# Patient Record
Sex: FEMALE | Race: WHITE | Employment: FULL TIME | URBAN - METROPOLITAN AREA
[De-identification: names, ages, dates, MRNs, and addresses within clinical notes are randomized per-mention and may not be internally consistent; named-entity substitution may affect disease eponyms.]

---

## 2018-01-08 ENCOUNTER — OFFICE VISIT (OUTPATIENT)
Dept: NEUROLOGY | Age: 68
End: 2018-01-08

## 2018-01-08 VITALS — SYSTOLIC BLOOD PRESSURE: 140 MMHG | DIASTOLIC BLOOD PRESSURE: 85 MMHG | OXYGEN SATURATION: 98 % | HEART RATE: 77 BPM

## 2018-01-08 DIAGNOSIS — G43.009 MIGRAINE WITHOUT AURA AND WITHOUT STATUS MIGRAINOSUS, NOT INTRACTABLE: Primary | ICD-10-CM

## 2018-01-08 DIAGNOSIS — M54.2 CERVICALGIA OF OCCIPITO-ATLANTO-AXIAL REGION: ICD-10-CM

## 2018-01-08 DIAGNOSIS — R20.0 RIGHT LEG NUMBNESS: ICD-10-CM

## 2018-01-08 DIAGNOSIS — M51.9 LUMBAR DISC DISEASE: ICD-10-CM

## 2018-01-08 DIAGNOSIS — Z78.0 ASYMPTOMATIC MENOPAUSAL STATE: ICD-10-CM

## 2018-01-08 DIAGNOSIS — E83.52 HYPERCALCEMIA: ICD-10-CM

## 2018-01-08 RX ORDER — CYCLOBENZAPRINE HCL 5 MG
5 TABLET ORAL
Qty: 60 TAB | Refills: 5 | Status: SHIPPED | OUTPATIENT
Start: 2018-01-08 | End: 2020-08-18

## 2018-01-08 RX ORDER — SUMATRIPTAN 100 MG/1
100 TABLET, FILM COATED ORAL
Qty: 9 TAB | Refills: 5 | Status: SHIPPED | OUTPATIENT
Start: 2018-01-08 | End: 2020-08-18

## 2018-01-08 NOTE — LETTER
Dear Jovany Porras MD, Thank you for allowing me to see your patient, Layton Kong for a neurological consultation. Please see my impression and recommendations as outlined in my note. Sincerely, Yovany Tello MD 
[facility] at [address] [place] [address] BY: 
Jovany Porras MD 
 
CHIEF COMPLAINT: 
migraines HISTORY OF PRESENT ILLNESS HISTORY PROVIDED BY: 
Patient Layton Kong is a 75 y.o. female who I am asked to see in consultation for migraines. She has had migraines since age 14. They were very debilitating at the time. She would have to lie in a dark room for most of them. She tried and failed multiple medications. She used to have to be hospitalized for several days and get IV Demerol to report them. Over the last 10 years she reports that her migraines have significantly improved. Recently however she has started having more issues with headaches again. Last Friday she started with a headache on her left shoulder that radiates up to the back of her head. It then affected her bilateral occiput and then radiated up to her left eye. She tried to do a warm washcloth alternating with ice, which typically works to break the headache cycle, but this was not helpful. She does have Imitrex to take and she takes 25 mg tablets. Normally she can take this and have no further issues, but it is not working for his current headache. On Saturday she did have some congestion and could not even bend her head down. She denies any nausea associated with anything. She does have anxiety for which she takes Celexa and one fourth tablet of a 0.5 Klonopin. She would like to try to wean off of the Klonopin. She does follow with Dr. Niyah Love with cardiology. She recently had labs over the last 6 months or fine.  
She does have a history of lumbar stenosis, low back pain, and degenerative disks in that area. She is due for updated imaging but has not had this done yet. She does get pain radiating down her right leg as well as numbness on her right leg. She denies actually having any neck issues prior to this. She denies having any trauma to the neck or sleeping funny on her neck that she is aware of. Today she mostly can pinpoint an area on her neck where the pain is located. Patient does have a history of falls. She did have a CT of the head one year ago after a significant fall where she smacked her head into a door frame. The head CT showed a scalp hematoma but no intracranial process. Patient has not tried any muscle relaxers for her current neck issue. She is also not tried massage. PMH Past Medical History:  
Diagnosis Date  Hx of bone density study 3/12/12 Normal  
 Hx of mammogram 7/31/14 Negative  Panic attacks  Pap smear for cervical cancer screening 3/22/13 Negative, HPV Negative  Vitamin D deficiency [facility] Social History Social History  Marital status:  Spouse name: N/A  
 Number of children: N/A  
 Years of education: N/A Social History Main Topics  Smoking status: Never Smoker  Smokeless tobacco: Never Used Comment: Never used vapor or e-cigs  Alcohol use No  
 Drug use: No  
 Sexual activity: Yes  
  Partners: Male Birth control/ protection: None Other Topics Concern  Not on file Social History Narrative [facility] Family History Problem Relation Age of Onset  Colon Cancer Paternal Grandmother  Hypertension Father ALLERGIES Allergies Allergen Reactions  Demerol [Meperidine] Other (comments) BP drops  Pcn [Penicillins] Rash CURRENT MEDS Current Outpatient Prescriptions Medication Sig Dispense Refill  atorvastatin (LIPITOR) 10 mg tablet   3  
 aspirin delayed-release 81 mg tablet Take 81 mg by mouth daily.  red yeast rice extract 600 mg cap Take 1,000 mg by mouth daily.  cholecalciferol, vitamin D3, 2,000 unit tab Take 1 Tab by mouth daily.  magnesium 250 mg tab Take 1 Tab by mouth daily. 5 Tab 0  clonazePAM (KLONOPIN) 0.5 mg tablet Take 0.5 mg by mouth nightly.  citalopram (CELEXA) 20 mg tablet Take 10 mg by mouth every evening.  SUMAtriptan (IMITREX) 100 mg tablet Take 100 mg by mouth as needed.  OTHER     
 
 
REVIEW OF SYSTEMS:  
 
Y  N       Y  N  Y  N   Y  N 
  AIDS            Falls    Memory Loss     Shortness of breath Anxiety            Fatigue   Muscle Pain           Skipped beats Chest Pain     Frequent HA   Ms Weakness        Snoring Constipation  Hearing loss   Nause/Vomiting     Stomach Pain Cough           Hepatitis   Neuropathy            Swallowing difficulty Depression   Incontinence   Poor appetite         Vertigo Diarrhea         Joint Pain   Rash                      Visual disturbances Fainting          Leg Swelling   Ringing ears          Weight changes Unable to obtain  ROS due to  mental status change  sedated   intubated PREVIOUS WORKUP IMAGING: CT head no acute intracranial process (I personally reviewed these images in PACS and this is my impression) LABS Results for orders placed or performed in visit on 02/02/16 PAP, IG, RFX HPV ASCUS ([id number]) Result Value Ref Range Diagnosis Comment Specimen adequacy Comment Clinician provided ICD10 Comment Performed by: Comment Harpal Green Note: Comment Test methodology Comment Harpal Green Comment PHYSICAL EXAM 
Visit Vitals  /85  Pulse 77  SpO2 98% General:  Alert, cooperative, no distress. Head:  Normocephalic, without obvious abnormality, atraumatic. Eyes:  Conjunctivae/corneas clear. Pupils equal, round, reactive to light. Extraocular movements intact, VFF, NO papilledema Lungs: 
Heart:   Non labored breathing Regular rate and rhythm, no carotid bruits Abdomen:   Soft, non-distended Extremities: Extremities normal, atraumatic, no cyanosis or edema. Pulses: 2+ and symmetric all extremities. Skin: Skin color, texture, turgor normal. No rashes or lesions. Neurologic:  Gen: Attention normal 
           Language: naming, repetition, fluency normal 
           Memory: intact recent and remote memory Cranial Nerves: 
I: smell Not tested II: visual fields Full to confrontation II: pupils Equal, round, reactive to light II: optic disc No papilledema III,VII: ptosis none III,IV,VI: extraocular muscles  Full ROM V: mastication normal  
V: facial light touch sensation  normal  
VII: facial muscle function   symmetric VIII: hearing symmetric IX: soft palate elevation  normal  
XI: trapezius strength  5/5 XI: sternocleidomastoid strength 5/5 XI: neck flexion strength  5/5 XII: tongue  midline Motor: normal bulk and tone, no tremor Strength: 5/5 all four extremities Sensory: intact to LT, PP, vibration, and temperature Coordination: FTN intact, Rhomberg negative Gait: normal gait including tandem Reflexes: 2+ in upper extremities and left lower extremity, 3+ in right lower extremity IMPRESSION Carmita Tellez is a 75 y.o. female who presents for evaluation of migraines. Currently patient has a 3 day migraine type headache with associated neck pain. I suspect this is a cervicogenic type of headache. Will try muscle relaxers. If this is not beneficial will try steroids. Will wait on any imaging of the neck at this time. In terms of preventative I do not think patient needs this at this time. Will continue Imitrex for abortive. She does continue to have low back pain and radiating numbness down the right leg. Will do an MRI of the lumbar spine to evaluate this further. RECOMMENDATIONS 1. Start Flexeril 5 mg nightly and can increase to 10 mg if needed 2.  Continue abortive with Imitrex 3. No need for preventative for migraine at this time as patient is only having sporadic migraines at this point 4. MRI of the lumbar spine 5. DEXA scan (patient previously had disorder but has not had it done) 6. Will call if Flexeril was not helpful and will do a Medrol Dosepak and consider neck imaging 7. Patient is also interested in weaning off of her clonazepam.  We discussed how to slowly do this over several months if she would like. Advised her to wait until this current headache is resolved. FU 6 months but will notify sooner of imaging results and further testing if indicated Magdaleno Montenegro MD 
 
CC: Aleyda Ureña MD 
Fax: [phone] This note was created using voice recognition software. Despite editing, there may be syntax errors. This note will not be viewable in [address]

## 2018-01-08 NOTE — MR AVS SNAPSHOT
Visit Information Date & Time Provider Department Dept. Phone Encounter #  
 1/8/2018  1:00 PM Chip Puentes MD [address] [place] [address] [facility] [phone] [id number] Upcoming Health Maintenance Date Due Hepatitis C Screening 1950 FOBT Q 1 YEAR AGE 50-75 10/7/1999 ZOSTER VACCINE AGE 60> 8/7/2009 GLAUCOMA SCREENING Q2Y 10/7/2014 Pneumococcal 65+ Low/Medium Risk (1 of 2 - PCV13) 10/7/2014 MEDICARE YEARLY EXAM 10/7/2014 Influenza Age 5 to Adult 8/1/2017 BREAST CANCER SCRN MAMMOGRAM 2/2/2018 DTaP/Tdap/Td series (2 - Td) 8/29/2021 Allergies as of 1/8/2018  Review Complete On: 1/8/2018 By: Elen Matos Severity Noted Reaction Type Reactions Demerol [Meperidine]  08/28/2011    Other (comments) BP drops Pcn [Penicillins]  08/28/2011    Rash Current Immunizations  Never Reviewed Name Date TDAP Vaccine 8/29/2011 12:22 AM  
  
 Not reviewed this visit You Were Diagnosed With   
  
 Codes Comments Hypercalcemia    -  Primary ICD-10-CM: G65.26 
ICD-9-CM: 275.42 Lumbar disc disease     ICD-10-CM: M51.9 ICD-9-CM: 722.93 Migraine without aura and without status migrainosus, not intractable     ICD-10-CM: Q36.618 ICD-9-CM: 346.10 Cervicalgia of tqhoammd-yhlipsy-dzrza region     ICD-10-CM: M54.2 ICD-9-CM: 723.1 Hypercalcemia     ICD-10-CM: N96.88 
ICD-9-CM: 275.42 Right leg numbness     ICD-10-CM: R20.0 ICD-9-CM: 782.0 Asymptomatic menopausal state     ICD-10-CM: Z78.0 ICD-9-CM: V49.81 Vitals BP Pulse SpO2 OB Status Smoking Status 140/85 77 98% Postmenopausal Never Smoker Preferred Pharmacy Pharm[facility] Phone [id number] [address] AT [facility] [phone] Your Updated Medication List  
  
   
This list is accurate as of: 1/8/18  2:01 PM.  Always use your most recent med list.  
  
  
  
  
 aspirin delayed-release 81 mg tablet Take 81 mg by mouth daily. atorvastatin 10 mg tablet Commonly known as:  LIPITOR  
  
 cholecalciferol (vitamin D3) 2,000 unit Tab Take 1 Tab by mouth daily. citalopram 20 mg tablet Commonly known as:  Jeff Octaviano Take 10 mg by mouth every evening. clonazePAM 0.5 mg tablet Commonly known as:  Jacqui Bimler Take 0.5 mg by mouth nightly. cyclobenzaprine 5 mg tablet Commonly known as:  FLEXERIL Take 1 Tab by mouth three (3) times daily as needed for Muscle Spasm(s).  
  
 magnesium 250 mg Tab Take 1 Tab by mouth daily. OTHER  
  
 red yeast rice extract 600 mg Cap Take 1,000 mg by mouth daily. * SUMAtriptan 100 mg tablet Commonly known as:  IMITREX Take 100 mg by mouth as needed. * SUMAtriptan 100 mg tablet Commonly known as:  IMITREX Take 1 Tab by mouth once as needed for Migraine for up to 1 dose. * Notice: This list has 2 medication(s) that are the same as other medications prescribed for you. Read the directions carefully, and ask your doctor or other care provider to review them with you. Prescriptions Sent to Pharmacy Refills  
 cyclobenzaprine (FLEXERIL) 5 mg tablet 5 Sig: Take 1 Tab by mouth three (3) times daily as needed for Muscle Spasm(s). Class: Normal  
 Pharmacy: [address] [place] [address] Ph #: [phone] Route: Oral  
 SUMAtriptan (IMITREX) 100 mg tablet 5 Sig: Take 1 Tab by mouth once as needed for Migraine for up to 1 dose. Class: Normal  
 Pharmacy: [address] [place] [address] Ph #: [phone] Route: Oral  
  
To-Do List   
 01/09/2018 Imaging:  DEXA BONE DENSITY STUDY AXIAL   
  
 01/09/2018 Imaging:  MRI LUMB SPINE WO CONT Patient Instructions Gulshan Rodriguez 1721 What is a living will? A living will is a legal form you use to write down the kind of care you want at the end of your life. It is used by the health professionals who will treat you if you aren't able to decide for yourself. If you put your wishes in writing, your loved ones and others will know what kind of care you want. They won't need to guess. This can ease your mind and be helpful to others. A living will is not the same as an estate or property will. An estate will explains what you want to happen with your money and property after you die. Is a living will a legal document? A living will is a legal document. Each state has its own laws about living jones. If you move to another state, make sure that your living will is legal in the state where you now live. Or you might use a universal form that has been approved by many states. This kind of form can sometimes be completed and stored online. Your electronic copy will then be available wherever you have a connection to the Internet. In most cases, doctors will respect your wishes even if you have a form from a different state. · You don't need an  to complete a living will. But legal advice can be helpful if your state's laws are unclear, your health history is complicated, or your family can't agree on what should be in your living will. · You can change your living will at any time. Some people find that their wishes about end-of-life care change as their health changes. · In addition to making a living will, think about completing a medical power of  form. This form lets you name the person you want to make end-of-life treatment decisions for you (your \"health care agent\") if you're not able to. Many hospitals and nursing homes will give you the forms you need to complete a living will and a medical power of .  
· Your living will is used only if you can't make or communicate decisions for yourself anymore. If you become able to make decisions again, you can accept or refuse any treatment, no matter what you wrote in your living will. · Your state may offer an online registry. This is a place where you can store your living will online so the doctors and nurses who need to treat you can find it right away. What should you think about when creating a living will? Talk about your end-of-life wishes with your family members and your doctor. Let them know what you want. That way the people making decisions for you won't be surprised by your choices. Think about these questions as you make your living will: · Do you know enough about life support methods that might be used? If not, talk to your doctor so you know what might be done if you can't breathe on your own, your heart stops, or you're unable to swallow. · What things would you still want to be able to do after you receive life-support methods? Would you want to be able to walk? To speak? To eat on your own? To live without the help of machines? · If you have a choice, where do you want to be cared for? In your home? At a hospital or nursing home? · Do you want certain Adventist practices performed if you become very ill? · If you have a choice at the end of your life, where would you prefer to die? At home? In a hospital or nursing home? Somewhere else? · Would you prefer to be buried or cremated? · Do you want your organs to be donated after you die? What should you do with your living will? · Make sure that your family members and your health care agent have copies of your living will. · Give your doctor a copy of your living will to keep in your medical record. If you have more than one doctor, make sure that each one has a copy. · You may want to put a copy of your living will where it can be easily found. Where can you learn more? Go to http://mora-maggie.info/. Enter K808 in the search box to learn more about \"Learning About Living Vivian. \" Current as of: September 24, 2016 Content Version: 11.4 © 3723-0671 Healthwise, [employer]. Care instructions adapted under license by [employer] (which disclaims liability or warranty for this information). If you have questions about a medical condition or this instruction, always ask your healthcare professional. [place] [address] any warranty or liability for your use of this information. PRESCRIPTION REFILL POLICY [facility] Statement to Patients April 1, 2014 In an effort to ensure the large volume of patient prescription refills is processed in the most efficient and expeditious manner, we are asking our patients to assist us by calling your Pharmacy for all prescription refills, this will include also your  Mail Order Pharmacy. The pharmacy will contact our office electronically to continue the refill process. Please do not wait until the last minute to call your pharmacy. We need at least 48 hours (2days) to fill prescriptions. We also encourage you to call your pharmacy before going to  your prescription to make sure it is ready. With regard to controlled substance prescription refill requests (narcotic refills) that need to be picked up at our office, we ask your cooperation by providing us with at least 72 hours (3days) notice that you will need a refill. We will not refill narcotic prescription refill requests after 4:00pm on any weekday, Monday through Thursday, or after 2:00pm on Fridays, or on the weekends. We encourage everyone to explore another way of getting your prescription refill request processed using [employer], our patient web portal through our electronic medical record system.  Rundownt is an efficient and effective way to communicate your medication request directly to the office and downloadable as an kori on your smart phone . Keepstream also features a review functionality that allows you to view your medication list as well as leave messages for your physician. Are you ready to get connected? If so please review the attatched instructions or speak to any of our staff to get you set up right away! Thank you so much for your cooperation. Should you have any questions please contact our Practice Administrator. The Physicians and Staff,  [facility] If we have ordered testing for you, we typically do not call patients with results. Your doctor or nurse will contact you if there are critical results that need to be addressed before your next appointment. We also schedule follow up appointments so that your results can be discussed in person and any questions you have regarding them may be addressed. Additionally, results may be found by using the My Chart feature and one of our patient service representatives at the   
can give you instructions on how to access this feature of our electronic medical record system. Patient Instructions/Plans: 
Please call us if the Flexeril does not help enough and we will do a steroid Dosepak Cyclobenzaprine (By mouth) Cyclobenzaprine (pfq-ucqh-XMD-jacqui-preen) Treats pain and stiffness caused by muscle spasms. Brand Name(s): Amrix, Cyclo/Bill 10/300 Pack, CycloTENS Refill Tk, CycloTENS Starter Tk, Cyclobenzaprine Comfort Pac, CyclobenzaprinePax, Fexmid, FlexePax, FusePaq Tabradol, RapidPaq Tabradol There may be other brand names for this medicine. When This Medicine Should Not Be Used: This medicine is not right for everyone. Do not use it if you had an allergic reaction to cyclobenzaprine. How to Use This Medicine:  
Long Acting Capsule, Liquid, Tablet · Your doctor will tell you how much medicine to use. Do not use more than directed. · Take this medicine at the same time each day. · Swallow the extended-release capsule whole. Do not crush, break, or chew it. · If you cannot swallow the capsule whole, you may open the capsule and sprinkle the contents over one tablespoon of applesauce. Swallow the mixture right away without chewing. Rinse the mouth to make sure all of the medicine have been swallowed. Do not save any of the mixture to use later. · This medicine is not for long-term use. · Missed dose: Take a dose as soon as you remember. If it is almost time for your next dose, wait until then and take a regular dose. Do not take extra medicine to make up for a missed dose. · Store the medicine in a closed container at room temperature, away from heat, moisture, and direct light. Drugs and Foods to Avoid: Ask your doctor or pharmacist before using any other medicine, including over-the-counter medicines, vitamins, and herbal products. · Do not use this medicine if you have used an MAO inhibitor (MAOI) within 14 days of each other. · Some foods and medicines can affect how this medicine works. Tell your doctor if you are using any of the following:  
¨ Bupropion, guanethidine, meperidine, tramadol, verapamil ¨ Medicine to treat depression (including amitriptyline, imipramine) · Do not drink alcohol while you are using this medicine. · Tell your doctor if you use anything else that makes you sleepy. Some examples are allergy medicine, narcotic pain medicine, and alcohol. Warnings While Using This Medicine: · Tell your doctor if you are pregnant or breastfeeding, or if you have liver problems, congestive heart failure, heart rhythm problems, a recent heart attack, overactive thyroid, or a history of glaucoma or trouble urinating. · This medicine may cause the following problems: 
¨ Serotonin syndrome, when taken with certain medicines · This medicine may make you dizzy or drowsy. Do not drive or doing anything that could be dangerous until you know how this medicine affects you. · Call your doctor if your symptoms do not improve or if they get worse. · Keep all medicine out of the reach of children. Never share your medicine with anyone. Possible Side Effects While Using This Medicine:  
Call your doctor right away if you notice any of these side effects: · Allergic reaction: Itching or hives, swelling in your face or hands, swelling or tingling in your mouth or throat, chest tightness, trouble breathing · Anxiety, restlessness, fever, sweating, twitching, nausea, vomiting, diarrhea, seeing or hearing things that are not there · Fast, pounding, or uneven heartbeat · Severe drowsiness, fainting, or confusion If you notice these less serious side effects, talk with your doctor: · Dizziness · Dry mouth If you notice other side effects that you think are caused by this medicine, tell your doctor. Call your doctor for medical advice about side effects. You may report side effects to FDA at 1-260-FDA-5463 © 2017 [address]  Information is for End User's use only and may not be sold, redistributed or otherwise used for commercial purposes. The above information is an  only. It is not intended as medical advice for individual conditions or treatments. Talk to your doctor, nurse or pharmacist before following any medical regimen to see if it is safe and effective for you. Introducing [facility]! Jameel Redman introduces [employer] patient portal. Now you can access parts of your medical record, email your doctor's office, and request medication refills online. 1. In your internet browser, go to [email or web address] 2. Click on the First Time User? Click Here link in the Sign In box. You will see the New Member Sign Up page. 3. Enter your [employer] Access Code exactly as it appears below. You will not need to use this code after youve completed the sign-up process.  If you do not sign up before the expiration date, you must request a new code. · M[employer] Access Code: [email or web address] Expires: 4/8/2018  2:01 PM 
 
4. Enter the last four digits of your Social Security Number (xxxx) and Date of Birth (mm/dd/yyyy) as indicated and click Submit. You will be taken to the next sign-up page. 5. Create a M[employer] ID. This will be your [employer] login ID and cannot be changed, so think of one that is secure and easy to remember. 6. Create a [employer] password. You can change your password at any time. 7. Enter your Password Reset Question and Answer. This can be used at a later time if you forget your password. 8. Enter your e-mail address. You will receive e-mail notification when new information is available in [address] 9. Click Sign Up. You can now view and download portions of your medical record. 10. Click the Download Summary menu link to download a portable copy of your medical information. If you have questions, please visit the Frequently Asked Questions section of the [employer] website. Remember, [employer] is NOT to be used for urgent needs. For medical emergencies, dial 911. Now available from your iPhone and Android! Please provide this summary of care documentation to your next provider. Your primary care clinician is listed as Jared Velez. If you have any questions after today's visit, please call [phone].

## 2018-01-08 NOTE — PROGRESS NOTES
NEUROLOGY NEW PATIENT CONSULTATION    REFERRED BY:  Carmen Montana MD    CHIEF COMPLAINT:  migraines    HISTORY OF PRESENT ILLNESS    HISTORY PROVIDED BY:  Patient      Anastasiia Oscar is a 75 y.o. female who I am asked to see in consultation for migraines. She has had migraines since age 14. They were very debilitating at the time. She would have to lie in a dark room for most of them. She tried and failed multiple medications. She used to have to be hospitalized for several days and get IV Demerol to report them. Over the last 10 years she reports that her migraines have significantly improved. Recently however she has started having more issues with headaches again. Last Friday she started with a headache on her left shoulder that radiates up to the back of her head. It then affected her bilateral occiput and then radiated up to her left eye. She tried to do a warm washcloth alternating with ice, which typically works to break the headache cycle, but this was not helpful. She does have Imitrex to take and she takes 25 mg tablets. Normally she can take this and have no further issues, but it is not working for his current headache. On Saturday she did have some congestion and could not even bend her head down. She denies any nausea associated with anything. She does have anxiety for which she takes Celexa and one fourth tablet of a 0.5 Klonopin. She would like to try to wean off of the Klonopin. She does follow with Dr. Jack Yang with cardiology. She recently had labs over the last 6 months or fine. She does have a history of lumbar stenosis, low back pain, and degenerative disks in that area. She is due for updated imaging but has not had this done yet. She does get pain radiating down her right leg as well as numbness on her right leg. She denies actually having any neck issues prior to this. She denies having any trauma to the neck or sleeping funny on her neck that she is aware of.   Today she mostly can pinpoint an area on her neck where the pain is located. Patient does have a history of falls. She did have a CT of the head one year ago after a significant fall where she smacked her head into a door frame. The head CT showed a scalp hematoma but no intracranial process. Patient has not tried any muscle relaxers for her current neck issue. She is also not tried massage. PMH  Past Medical History:   Diagnosis Date    Hx of bone density study 3/12/12    Normal    Hx of mammogram 7/31/14    Negative     Panic attacks     Pap smear for cervical cancer screening 3/22/13    Negative, HPV Negative    Vitamin D deficiency        SH  Social History     Social History    Marital status:      Spouse name: N/A    Number of children: N/A    Years of education: N/A     Social History Main Topics    Smoking status: Never Smoker    Smokeless tobacco: Never Used      Comment: Never used vapor or e-cigs     Alcohol use No    Drug use: No    Sexual activity: Yes     Partners: Male     Birth control/ protection: None     Other Topics Concern    Not on file     Social History Narrative       FH  Family History   Problem Relation Age of Onset    Colon Cancer Paternal Grandmother     Hypertension Father        ALLERGIES  Allergies   Allergen Reactions    Demerol [Meperidine] Other (comments)     BP drops    Pcn [Penicillins] Rash       CURRENT MEDS  Current Outpatient Prescriptions   Medication Sig Dispense Refill    atorvastatin (LIPITOR) 10 mg tablet   3    aspirin delayed-release 81 mg tablet Take 81 mg by mouth daily.  red yeast rice extract 600 mg cap Take 1,000 mg by mouth daily.  cholecalciferol, vitamin D3, 2,000 unit tab Take 1 Tab by mouth daily.  magnesium 250 mg tab Take 1 Tab by mouth daily. 5 Tab 0    clonazePAM (KLONOPIN) 0.5 mg tablet Take 0.5 mg by mouth nightly.  citalopram (CELEXA) 20 mg tablet Take 10 mg by mouth every evening.       SUMAtriptan (IMITREX) 100 mg tablet Take 100 mg by mouth as needed.  OTHER          REVIEW OF SYSTEMS:     Y  N       Y  N  Y  N   Y  N  [] [x] AIDS          [] [x] Falls  [] [x] Memory Loss  [] [x]  Shortness of breath  [x] [] Anxiety          [x] [] Fatigue [] [x] Muscle Pain        [] [x]  Skipped beats  [] [x] Chest Pain   [x] [] Frequent HA [] [x] Ms Weakness     [] [x]  Snoring  [] [x] Constipation [] [x]Hearing loss [] [x] Nause/Vomiting  [] [x]  Stomach Pain  [] [x] Cough          [] [x]Hepatitis [] [x] Neuropathy         [] [x]  Swallowing difficulty  [] [x] Depression  [] [x]Incontinence [] [x] Poor appetite      [] [x]  Vertigo  [] [x] Diarrhea       [x] [] Joint Pain [] [x] Rash                   [] [x]  Visual disturbances  [] [x] Fainting        [] [x] Leg Swelling [] [x] Ringing ears       [] [x]  Weight changes      []Unable to obtain  ROS due to  []mental status change  []sedated   []intubated          PREVIOUS WORKUP  IMAGING: CT head no acute intracranial process  (I personally reviewed these images in PACS and this is my impression)    LABS  Results for orders placed or performed in visit on 02/02/16   PAP, IG, RFX HPV ASCUS ([id number])   Result Value Ref Range    Diagnosis Comment     Specimen adequacy Comment     Clinician provided ICD10 Comment     Performed by: Comment     . Ivania Carnes Note: Comment     Test methodology Comment     . Comment        PHYSICAL EXAM  Visit Vitals    /85    Pulse 77    SpO2 98%     General:  Alert, cooperative, no distress. Head:  Normocephalic, without obvious abnormality, atraumatic. Eyes:  Conjunctivae/corneas clear. Pupils equal, round, reactive to light. Extraocular movements intact, VFF, NO papilledema   Lungs:  Heart:   Non labored breathing  Regular rate and rhythm, no carotid bruits   Abdomen:   Soft, non-distended   Extremities: Extremities normal, atraumatic, no cyanosis or edema. Pulses: 2+ and symmetric all extremities.    Skin: Skin color, texture, turgor normal. No rashes or lesions. Neurologic:  Gen: Attention normal             Language: naming, repetition, fluency normal             Memory: intact recent and remote memory  Cranial Nerves:  I: smell Not tested   II: visual fields Full to confrontation   II: pupils Equal, round, reactive to light   II: optic disc No papilledema   III,VII: ptosis none   III,IV,VI: extraocular muscles  Full ROM   V: mastication normal   V: facial light touch sensation  normal   VII: facial muscle function   symmetric   VIII: hearing symmetric   IX: soft palate elevation  normal   XI: trapezius strength  5/5   XI: sternocleidomastoid strength 5/5   XI: neck flexion strength  5/5   XII: tongue  midline     Motor: normal bulk and tone, no tremor              Strength: 5/5 all four extremities  Sensory: intact to LT, PP, vibration, and temperature  Coordination: FTN intact, Rhomberg negative  Gait: normal gait including tandem   Reflexes: 2+ in upper extremities and left lower extremity, 3+ in right lower extremity       IMPRESSION  Chivo Ortega is a 75 y.o. female who presents for evaluation of migraines. Currently patient has a 3 day migraine type headache with associated neck pain. I suspect this is a cervicogenic type of headache. Will try muscle relaxers. If this is not beneficial will try steroids. Will wait on any imaging of the neck at this time. In terms of preventative I do not think patient needs this at this time. Will continue Imitrex for abortive. She does continue to have low back pain and radiating numbness down the right leg. Will do an MRI of the lumbar spine to evaluate this further. RECOMMENDATIONS  1. Start Flexeril 5 mg nightly and can increase to 10 mg if needed  2. Continue abortive with Imitrex  3. No need for preventative for migraine at this time as patient is only having sporadic migraines at this point  4. MRI of the lumbar spine  5.   DEXA scan (patient previously had disorder but has not had it done)  6. Will call if Flexeril was not helpful and will do a Medrol Dosepak and consider neck imaging  7. Patient is also interested in weaning off of her clonazepam.  We discussed how to slowly do this over several months if she would like. Advised her to wait until this current headache is resolved. FU 6 months but will notify sooner of imaging results and further testing if indicated    Shayy Walden MD    CC: Louis Roberto MD  Fax: [phone]    This note was created using voice recognition software. Despite editing, there may be syntax errors. This note will not be viewable in [address]

## 2018-01-08 NOTE — PATIENT INSTRUCTIONS
Learning About Marcelino Myers  What is a living will? A living will is a legal form you use to write down the kind of care you want at the end of your life. It is used by the health professionals who will treat you if you aren't able to decide for yourself. If you put your wishes in writing, your loved ones and others will know what kind of care you want. They won't need to guess. This can ease your mind and be helpful to others. A living will is not the same as an estate or property will. An estate will explains what you want to happen with your money and property after you die. Is a living will a legal document? A living will is a legal document. Each state has its own laws about living jones. If you move to another state, make sure that your living will is legal in the state where you now live. Or you might use a universal form that has been approved by many states. This kind of form can sometimes be completed and stored online. Your electronic copy will then be available wherever you have a connection to the Internet. In most cases, doctors will respect your wishes even if you have a form from a different state. · You don't need an  to complete a living will. But legal advice can be helpful if your state's laws are unclear, your health history is complicated, or your family can't agree on what should be in your living will. · You can change your living will at any time. Some people find that their wishes about end-of-life care change as their health changes. · In addition to making a living will, think about completing a medical power of  form. This form lets you name the person you want to make end-of-life treatment decisions for you (your \"health care agent\") if you're not able to. Many hospitals and nursing homes will give you the forms you need to complete a living will and a medical power of .   · Your living will is used only if you can't make or communicate decisions for yourself anymore. If you become able to make decisions again, you can accept or refuse any treatment, no matter what you wrote in your living will. · Your state may offer an online registry. This is a place where you can store your living will online so the doctors and nurses who need to treat you can find it right away. What should you think about when creating a living will? Talk about your end-of-life wishes with your family members and your doctor. Let them know what you want. That way the people making decisions for you won't be surprised by your choices. Think about these questions as you make your living will:  · Do you know enough about life support methods that might be used? If not, talk to your doctor so you know what might be done if you can't breathe on your own, your heart stops, or you're unable to swallow. · What things would you still want to be able to do after you receive life-support methods? Would you want to be able to walk? To speak? To eat on your own? To live without the help of machines? · If you have a choice, where do you want to be cared for? In your home? At a hospital or nursing home? · Do you want certain Jain practices performed if you become very ill? · If you have a choice at the end of your life, where would you prefer to die? At home? In a hospital or nursing home? Somewhere else? · Would you prefer to be buried or cremated? · Do you want your organs to be donated after you die? What should you do with your living will? · Make sure that your family members and your health care agent have copies of your living will. · Give your doctor a copy of your living will to keep in your medical record. If you have more than one doctor, make sure that each one has a copy. · You may want to put a copy of your living will where it can be easily found. Where can you learn more? Go to http://mora-maggie.info/.   Enter O350 in the search box to learn more about \"Learning About Living Vivian. \"  Current as of: September 24, 2016  Content Version: 11.4  © 1979-8214 [employer]. Care instructions adapted under license by [employer] (which disclaims liability or warranty for this information). If you have questions about a medical condition or this instruction, always ask your healthcare professional. [address] any warranty or liability for your use of this information. [facility]   Statement to Patients  April 1, 2014      In an effort to ensure the large volume of patient prescription refills is processed in the most efficient and expeditious manner, we are asking our patients to assist us by calling your Pharmacy for all prescription refills, this will include also your  Mail Order Pharmacy. The pharmacy will contact our office electronically to continue the refill process. Please do not wait until the last minute to call your pharmacy. We need at least 48 hours (2days) to fill prescriptions. We also encourage you to call your pharmacy before going to  your prescription to make sure it is ready. With regard to controlled substance prescription refill requests (narcotic refills) that need to be picked up at our office, we ask your cooperation by providing us with at least 72 hours (3days) notice that you will need a refill. We will not refill narcotic prescription refill requests after 4:00pm on any weekday, Monday through Thursday, or after 2:00pm on Fridays, or on the weekends. We encourage everyone to explore another way of getting your prescription refill request processed using [employer], our patient web portal through our electronic medical record system. [employer] is an efficient and effective way to communicate your medication request directly to the office and  downloadable as an kori on your smart phone .  [employer] also features a review functionality that allows you to view your medication list as well as leave messages for your physician. Are you ready to get connected? If so please review the attatched instructions or speak to any of our staff to get you set up right away! Thank you so much for your cooperation. Should you have any questions please contact our Practice Administrator. The Physicians and Staff,  [facility]     If we have ordered testing for you, we typically do not call patients with results. Your doctor or nurse will contact you if there are critical results that need to be addressed before your next appointment. We also schedule follow up appointments so that your results can be discussed in person and any questions you have regarding them may be addressed. Additionally, results may be found by using the My Chart feature and one of our patient service representatives at the    can give you instructions on how to access this feature of our electronic medical record system. Patient Instructions/Plans:  Please call us if the Flexeril does not help enough and we will do a steroid Dosepak    Cyclobenzaprine (By mouth)   Cyclobenzaprine ([id number])  Treats pain and stiffness caused by muscle spasms. Brand Name(s): Amrix, Cyclo/Bill 10/300 Pack, CycloTENS Refill Tk, CycloTENS Starter Kt, Cyclobenzaprine Comfort Pac, CyclobenzaprinePax, Fexmid, FlexePax, FusePaq Tabradol, RapidPaq Tabradol   There may be other brand names for this medicine. When This Medicine Should Not Be Used: This medicine is not right for everyone. Do not use it if you had an allergic reaction to cyclobenzaprine. How to Use This Medicine:   Long Acting Capsule, Liquid, Tablet  · Your doctor will tell you how much medicine to use. Do not use more than directed. · Take this medicine at the same time each day. · Swallow the extended-release capsule whole. Do not crush, break, or chew it.   · If you cannot swallow the capsule whole, you may open the capsule and sprinkle the contents over one tablespoon of applesauce. Swallow the mixture right away without chewing. Rinse the mouth to make sure all of the medicine have been swallowed. Do not save any of the mixture to use later. · This medicine is not for long-term use. · Missed dose: Take a dose as soon as you remember. If it is almost time for your next dose, wait until then and take a regular dose. Do not take extra medicine to make up for a missed dose. · Store the medicine in a closed container at room temperature, away from heat, moisture, and direct light. Drugs and Foods to Avoid:   Ask your doctor or pharmacist before using any other medicine, including over-the-counter medicines, vitamins, and herbal products. · Do not use this medicine if you have used an MAO inhibitor (MAOI) within 14 days of each other. · Some foods and medicines can affect how this medicine works. Tell your doctor if you are using any of the following:   ¨ Bupropion, guanethidine, meperidine, tramadol, verapamil  ¨ Medicine to treat depression (including amitriptyline, imipramine)  · Do not drink alcohol while you are using this medicine. · Tell your doctor if you use anything else that makes you sleepy. Some examples are allergy medicine, narcotic pain medicine, and alcohol. Warnings While Using This Medicine:   · Tell your doctor if you are pregnant or breastfeeding, or if you have liver problems, congestive heart failure, heart rhythm problems, a recent heart attack, overactive thyroid, or a history of glaucoma or trouble urinating. · This medicine may cause the following problems:  ¨ Serotonin syndrome, when taken with certain medicines  · This medicine may make you dizzy or drowsy. Do not drive or doing anything that could be dangerous until you know how this medicine affects you. · Call your doctor if your symptoms do not improve or if they get worse.   · Keep all medicine out of the reach of children. Never share your medicine with anyone. Possible Side Effects While Using This Medicine:   Call your doctor right away if you notice any of these side effects:  · Allergic reaction: Itching or hives, swelling in your face or hands, swelling or tingling in your mouth or throat, chest tightness, trouble breathing  · Anxiety, restlessness, fever, sweating, twitching, nausea, vomiting, diarrhea, seeing or hearing things that are not there  · Fast, pounding, or uneven heartbeat  · Severe drowsiness, fainting, or confusion  If you notice these less serious side effects, talk with your doctor:   · Dizziness  · Dry mouth  If you notice other side effects that you think are caused by this medicine, tell your doctor. Call your doctor for medical advice about side effects. You may report side effects to FDA at 5-070-FDA-5478  © 2017 [address] Information is for End User's use only and may not be sold, redistributed or otherwise used for commercial purposes. The above information is an  only. It is not intended as medical advice for individual conditions or treatments. Talk to your doctor, nurse or pharmacist before following any medical regimen to see if it is safe and effective for you.

## 2019-04-08 NOTE — PROGRESS NOTES
Annual exam ages 69+    26[address] Latosha Kessler is a ,  70 y.o. female [facility] No LMP recorded. Patient is postmenopausal.  She presents for her annual checkup. LV=3/2016    She is having no significant problems. She was recently started Doxycycline due to a dog bite about a week ago. Thinks she might be developing a yeast infection. Has external itching, burning. Sx started in last couple of days. No discharge. Wondering if could be yeast infection or due to just starting topical testosterone cream (just started 10d ago, applying to vulva)    Was on BHRT. Was taking inconsistently, then stopped completely in December. Had severe hot flashes. Resumed about 10d ago, feeling better. Still having vaginal dryness and dyspareunia. With regard to the Gardasil vaccine, she is older than the age for which it is FDA approved. Menstrual status:    Her periods are nonexistent in flow due to menopause. She denies dysmenorrhea. Sexual history:    She  reports that she currently engages in sexual activity and has had partners who are Male. She reports using the following method of birth control/protection: None. Medical conditions:    Since her last annual GYN exam about three or more years ago, she has not the following changes in her health history: none. Pap and Mammogram History:    Her most recent Pap smear was normal obtained 3 year(s) ago, 3/2016, HPV testing not indicated. The patient had her mammogram today in our office. Breast Cancer History/Substance Abuse: negative    Osteoporosis History:    Family history does not include a first or second degree relative with osteopenia or osteoporosis. A bone density scan was obtained on 3/12/12 and revealed a normal scan. She is currently taking calcium and vit D.     Past Medical History:   Diagnosis Date    Hx of bone density study 3/12/12    Normal    Hx of bone density study 2012    WNL     Hx of colonoscopy     Hx of mammogram 7/31/14    Negative     Panic attacks     Pap smear for cervical cancer screening 3/22/13    Negative, HPV Negative    Vitamin D deficiency      Past Surgical History:   Procedure Laterality Date    HX BREAST AUGMENTATION Bilateral     HX DILATION AND CURETTAGE  07/2001    HX LAPAROTOMY  1986    Endometriosis,partial resection of ovaries bilaterally    HX OTHER SURGICAL  1980    Dx'd w/ e'osis--Laparoscopic Diagnostic       Current Outpatient Medications   Medication Sig Dispense Refill    doxycycline (VIBRAMYCIN) 100 mg capsule TAKE 1 CAPSULE BY MOUTH TWICE A DAY FOR 7 DAYS  0    prasterone, DHEA, (DHEA PO) Take 7.5 mg by mouth.  testosterone (ANDROGEL) 50 mg/5 gram (1 %) gel 5 g by TransDERmal route daily.  coenzyme q10 (CO Q-10) 10 mg cap Take  by mouth.  naproxen sodium (ALEVE) 220 mg tablet Take 220 mg by mouth two (2) times daily (with meals).  fluconazole (DIFLUCAN) 150 mg tablet Take 1 Tab by mouth daily for 1 day. FDA advises cautious prescribing of oral fluconazole in pregnancy. 1 Tab 0    SUMAtriptan (IMITREX) 100 mg tablet Take 1 Tab by mouth once as needed for Migraine for up to 1 dose. 9 Tab 5    atorvastatin (LIPITOR) 10 mg tablet   3    aspirin delayed-release 81 mg tablet Take 81 mg by mouth daily.  cholecalciferol, vitamin D3, 2,000 unit tab Take 1 Tab by mouth daily.  clonazePAM (KLONOPIN) 0.5 mg tablet Take 0.5 mg by mouth nightly.  citalopram (CELEXA) 20 mg tablet Take 10 mg by mouth every evening.  cyclobenzaprine (FLEXERIL) 5 mg tablet Take 1 Tab by mouth three (3) times daily as needed for Muscle Spasm(s). 60 Tab 5    red yeast rice extract 600 mg cap Take 1,000 mg by mouth daily.  magnesium 250 mg tab Take 1 Tab by mouth daily. 5 Tab 0    OTHER        Allergies: Demerol [meperidine] and Pcn [penicillins]     Tobacco History:  reports that she has never smoked.  She has never used smokeless tobacco.  Alcohol Abuse: reports that she does not drink alcohol. Drug Abuse:  reports that she does not use drugs.     Family Medical/Cancer History:   Family History   Problem Relation Age of Onset    Colon Cancer Paternal Grandmother 77    Hypertension Father     Pancreatic Cancer Maternal Grandmother         late 62s        Review of Systems - History obtained from the patient  Constitutional: negative for weight loss, fever, night sweats  HEENT: negative for hearing loss, earache, congestion, snoring, sorethroat  CV: negative for chest pain, palpitations, edema  Resp: negative for cough, shortness of breath, wheezing  GI: negative for change in bowel habits, abdominal pain, black or bloody stools  : negative for frequency, dysuria, hematuria  MSK: negative for back pain, joint pain, muscle pain  Breast: negative for breast lumps, nipple discharge, galactorrhea  Skin :negative for itching, rash, hives  Neuro: negative for dizziness, headache, confusion, weakness  Psych: negative for anxiety, depression, change in mood  Heme/lymph: negative for bleeding, bruising, pallor    Physical Exam    Visit Vitals  /77   Ht 5' 2\" (1.575 m)   Wt 132 lb 12.8 oz (60.2 kg)   BMI 24.29 kg/m²       Constitutional  · Appearance: well-nourished, well developed, alert, in no acute distress    HENT  · Head and Face: appears normal    Neck  · Inspection/Palpation: normal appearance, no masses or tenderness  · Lymph Nodes: no lymphadenopathy present  · Thyroid: gland size normal, nontender, no nodules or masses present on palpation    Chest  · Respiratory Effort: breathing unlabored  · Auscultation: normal breath sounds    Cardiovascular  · Heart:  · Auscultation: regular rate and rhythm without murmur    Breasts  · Inspection of Breasts: breasts symmetrical, no skin changes, no discharge present, nipple appearance normal, no skin retraction present  · Palpation of Breasts and Axillae: no masses present on palpation, no breast tenderness; bilat implants  · Axillary Lymph Nodes: no lymphadenopathy present    Gastrointestinal  · Abdominal Examination: abdomen non-tender to palpation, normal bowel sounds, no masses present  · Liver and spleen: no hepatomegaly present, spleen not palpable  · Hernias: no hernias identified    Genitourinary  · External Genitalia: normal appearance for age, no discharge present, no tenderness present, no inflammatory lesions present, no masses present, mild/mod atrophy present  · Vagina: atrophic but otherwise normal vaginal vault without central or paravaginal defects, scant thin discharge present, no inflammatory lesions present, no masses present  · Bladder: non-tender to palpation  · Urethra: appears normal  · Cervix: normal   · Uterus: normal size, shape and consistency  · Adnexa: no adnexal tenderness present, no adnexal masses present  · Perineum: perineum within normal limits, no evidence of trauma, no rashes or skin lesions present  · Anus: anus within normal limits, no hemorrhoids present  · Inguinal Lymph Nodes: no lymphadenopathy present    Skin  · General Inspection: no rash, no lesions identified    Neurologic/Psychiatric  · Mental Status:  · Orientation: grossly oriented to person, place and time  · Mood and Affect: mood normal, affect appropriate      Results for orders placed or performed in visit on 04/09/19   AMB POC SMEAR, STAIN & INTERPRET, WET MOUNT   Result Value Ref Range    Wet mount (POC) negative       Assessment & Plan:  · Routine gynecologic examination. Pap done. · Vaginal dryness, dyspareunia. Began after stopping BHRT which she has recently resumed. Discussed will likely improve now that she is back on her BHRT, although may take at least 3 months. Discussed vaginal moisturizers and lubricants, sexual health handout given. If does not improve, may consider vaginal estrogen or intrarosa. · Vulvar irritation, ?yeast vs sensitivity to topical testosterone. WP/KOH neg.   Will send Nuab for BV/yeast.  eRX Diflucan at pt's request.  · Counseled re: diet, exercise, healthy lifestyle  · Return for yearly wellness visits  · Rec annual mammogram      Orders Placed This Encounter    NUSWAB VAGINOSIS + CANDIDA    AMB POC WET PREP (AKA STAIN, INTERPRET, WET MOUNT)    fluconazole (DIFLUCAN) 150 mg tablet     Sig: Take 1 Tab by mouth daily for 1 day. FDA advises cautious prescribing of oral fluconazole in pregnancy.      Dispense:  1 Tab     Refill:  0    PAP, IG, RFX HPV ASCUS ([id number])

## 2019-04-09 ENCOUNTER — OFFICE VISIT (OUTPATIENT)
Dept: OBGYN CLINIC | Age: 69
End: 2019-04-09

## 2019-04-09 VITALS
BODY MASS INDEX: 24.44 KG/M2 | SYSTOLIC BLOOD PRESSURE: 130 MMHG | HEIGHT: 62 IN | DIASTOLIC BLOOD PRESSURE: 77 MMHG | WEIGHT: 132.8 LBS

## 2019-04-09 DIAGNOSIS — N90.5 VULVAR ATROPHY: ICD-10-CM

## 2019-04-09 DIAGNOSIS — N89.8 VAGINAL IRRITATION: ICD-10-CM

## 2019-04-09 DIAGNOSIS — N95.1 VAGINAL DRYNESS, MENOPAUSAL: ICD-10-CM

## 2019-04-09 DIAGNOSIS — Z01.419 ENCOUNTER FOR WELL WOMAN EXAM WITH ROUTINE GYNECOLOGICAL EXAM: Primary | ICD-10-CM

## 2019-04-09 DIAGNOSIS — N95.2 VAGINAL ATROPHY: ICD-10-CM

## 2019-04-09 DIAGNOSIS — N94.10 DYSPAREUNIA IN FEMALE: ICD-10-CM

## 2019-04-09 DIAGNOSIS — N90.89 VULVAR IRRITATION: ICD-10-CM

## 2019-04-09 LAB — WET MOUNT POCT, WMPOCT: NEGATIVE

## 2019-04-09 RX ORDER — DOXYCYCLINE 100 MG/1
CAPSULE ORAL
Refills: 0 | COMMUNITY
Start: 2019-04-03 | End: 2020-08-18 | Stop reason: ALTCHOICE

## 2019-04-09 RX ORDER — NAPROXEN SODIUM 220 MG
220 TABLET ORAL
COMMUNITY

## 2019-04-09 RX ORDER — AA/PROT/LYSINE/METHIO/VIT C/B6 50-12.5 MG
TABLET ORAL
COMMUNITY

## 2019-04-09 RX ORDER — TESTOSTERONE 50 MG/5G
5 GEL TRANSDERMAL DAILY
COMMUNITY
End: 2020-08-18

## 2019-04-09 RX ORDER — FLUCONAZOLE 150 MG/1
150 TABLET ORAL DAILY
Qty: 1 TAB | Refills: 0 | Status: SHIPPED | OUTPATIENT
Start: 2019-04-09 | End: 2019-04-10

## 2019-04-09 NOTE — PATIENT INSTRUCTIONS

## 2019-04-10 LAB
CYTOLOGIST CVX/VAG CYTO: NORMAL
CYTOLOGY CVX/VAG DOC THIN PREP: NORMAL
CYTOLOGY HISTORY:: NORMAL
DX ICD CODE: NORMAL
LABCORP, 190119: NORMAL
Lab: NORMAL
OTHER STN SPEC: NORMAL
PATH REPORT.FINAL DX SPEC: NORMAL
STAT OF ADQ CVX/VAG CYTO-IMP: NORMAL

## 2019-04-11 LAB
A VAGINAE DNA VAG QL NAA+PROBE: NORMAL SCORE
BVAB2 DNA VAG QL NAA+PROBE: NORMAL SCORE
C ALBICANS DNA VAG QL NAA+PROBE: NEGATIVE
C GLABRATA DNA VAG QL NAA+PROBE: NEGATIVE
MEGA1 DNA VAG QL NAA+PROBE: NORMAL SCORE

## 2019-04-18 ENCOUNTER — TELEPHONE (OUTPATIENT)
Dept: OBGYN CLINIC | Age: 69
End: 2019-04-18

## 2019-04-18 NOTE — TELEPHONE ENCOUNTER
Call received at 335PM    70year old patient last seen in the office on 4/9/19. Patient calling to say that she just missed a call from the office. This nurse advised the patient of MD reviewed lab results and recommendations and verbalized understanding.

## 2020-08-17 NOTE — PROGRESS NOTES
Menopause symptoms note      Pradip Doyle is a 78 y.o. female whose last menses was No LMP recorded. Patient is postmenopausal..        LV= 4/9/19 Was on BHRT. Stopped her [facility] January 2020. Was doing troches. In March 2020, reports increased vaginal dryness and internal localized painful intercourse. Increased hot flashes, and decreased libido. Reports trying lubricants, which helped. Would like to discuss restarting HRT's. Patient is sexually active. Does have CAD, reports statin dose was increased. Additional symptoms include none. Last Gyn testing: pap 4/9/19, normal    She has a history of  has a past medical history of bone density study (3/12/12), bone density study (03/12/2012), colonoscopy, mammogram (04/09/2019), Panic attacks, Pap smear for cervical cancer screening (3/22/13), and Vitamin D deficiency. with the following surgical history  has a past surgical history that includes hx breast augmentation (Bilateral); hx dilation and curettage (07/2001); hx laparotomy (1986); and hx other surgical (1980). .    Review of Systems - History obtained from the patient  Constitutional: negative for weight loss, fever, night sweats  HEENT: negative for hearing loss, earache, congestion, snoring, sorethroat  CV: negative for chest pain, palpitations, edema  Resp: negative for cough, shortness of breath, wheezing  Breast: negative for breast lumps, nipple discharge, galactorrhea  GI: negative for change in bowel habits, abdominal pain, black or bloody stools  : negative for frequency, dysuria, hematuria, vaginal discharge  MSK: negative for back pain, joint pain, muscle pain  Skin: negative for itching, rash, hives  Neuro: negative for dizziness, headache, confusion, weakness  Psych: negative for anxiety, depression, change in mood  Heme/lymph: negative for bleeding, bruising, pallor    Objective:  Visit Vitals  /79 (BP 1 Location: Left arm, BP Patient Position: Sitting)   Pulse 65   Ht 5' 2\" (1.575 m)   Wt 126 lb (57.2 kg)   BMI 23.05 kg/m²       Physical Exam:   PHYSICAL EXAMINATION    Constitutional  · Appearance: well-nourished, well developed, alert, in no acute distress    HENT  · Head and Face: appears normal      Gastrointestinal  · Abdominal Examination: abdomen non-tender to palpation, no masses present  · Liver and spleen: no hepatomegaly present, spleen not palpable  · Hernias: no hernias identified    Genitourinary  · External Genitalia: normal appearance for age, no discharge present, no tenderness present, no inflammatory lesions present, no masses present, atrophy present  · Vagina: atrophic, otherwise normal vaginal vault without central or paravaginal defects, no discharge or odor present, no inflammatory lesions present, no masses present  · Bladder: non-tender to palpation  · Urethra: appears normal  · Cervix: normal   · Uterus: normal size, shape and consistency  · Adnexa: no adnexal tenderness present, no adnexal masses present  · Perineum: perineum within normal limits, no evidence of trauma, no rashes or skin lesions present  · Anus: anus within normal limits, no hemorrhoids present  · Inguinal Lymph Nodes: no lymphadenopathy present    Skin  · General Inspection: no rash, no lesions identified    Neurologic/Psychiatric  · Mental Status:  · Orientation: grossly oriented to person, place and time  · Mood and Affect: mood normal, affect appropriate    Assessment:   Menopause symptoms  AE overdue    Plan:   Long discussion R/B of HRT. Discussed thromboembolic risk. May be at increased risk as she has know CAD and has been off of her HRT for ~8months. If she does decide to restart, would avoid oral estrogen. Can also discuss with her cardiologist to see if he has any other recommendations/reservations. Is on low dose Celexa. Discussed could consider increasing this dose to help with vasomotor sx. Advised to discuss with her PCP who prescribes this.   Can try vaginal estrogen for local tx. Would like to try RX. eRX Premarin vaginal cream.  Has AE scheduled for October. Can reassess sx at that time. [>50% of this 21-minute visit spent face-to-face counseling, and/or coordination of care]    Orders Placed This Encounter    conjugated estrogens (PREMARIN) 0.625 mg/gram vaginal cream     Sig: Insert 0.5 g into vagina two (2) times a week.      Dispense:  45 g     Refill:  0

## 2020-08-18 ENCOUNTER — OFFICE VISIT (OUTPATIENT)
Dept: OBGYN CLINIC | Age: 70
End: 2020-08-18
Payer: MEDICARE

## 2020-08-18 VITALS
BODY MASS INDEX: 23.19 KG/M2 | HEIGHT: 62 IN | HEART RATE: 65 BPM | DIASTOLIC BLOOD PRESSURE: 79 MMHG | SYSTOLIC BLOOD PRESSURE: 126 MMHG | WEIGHT: 126 LBS

## 2020-08-18 DIAGNOSIS — N94.10 DYSPAREUNIA, FEMALE: ICD-10-CM

## 2020-08-18 DIAGNOSIS — N95.1 MENOPAUSAL SYMPTOMS: Primary | ICD-10-CM

## 2020-08-18 DIAGNOSIS — N95.1 VAGINAL DRYNESS, MENOPAUSAL: ICD-10-CM

## 2020-08-18 PROCEDURE — 1101F PT FALLS ASSESS-DOCD LE1/YR: CPT | Performed by: OBSTETRICS & GYNECOLOGY

## 2020-08-18 PROCEDURE — G8420 CALC BMI NORM PARAMETERS: HCPCS | Performed by: OBSTETRICS & GYNECOLOGY

## 2020-08-18 PROCEDURE — 1090F PRES/ABSN URINE INCON ASSESS: CPT | Performed by: OBSTETRICS & GYNECOLOGY

## 2020-08-18 PROCEDURE — G8432 DEP SCR NOT DOC, RNG: HCPCS | Performed by: OBSTETRICS & GYNECOLOGY

## 2020-08-18 PROCEDURE — 99213 OFFICE O/P EST LOW 20 MIN: CPT | Performed by: OBSTETRICS & GYNECOLOGY

## 2020-08-18 PROCEDURE — 3017F COLORECTAL CA SCREEN DOC REV: CPT | Performed by: OBSTETRICS & GYNECOLOGY

## 2020-08-18 PROCEDURE — G8427 DOCREV CUR MEDS BY ELIG CLIN: HCPCS | Performed by: OBSTETRICS & GYNECOLOGY

## 2020-08-18 PROCEDURE — G9899 SCRN MAM PERF RSLTS DOC: HCPCS | Performed by: OBSTETRICS & GYNECOLOGY

## 2020-08-18 PROCEDURE — G8399 PT W/DXA RESULTS DOCUMENT: HCPCS | Performed by: OBSTETRICS & GYNECOLOGY

## 2020-08-18 PROCEDURE — G8536 NO DOC ELDER MAL SCRN: HCPCS | Performed by: OBSTETRICS & GYNECOLOGY

## 2020-08-18 RX ORDER — DICLOFENAC SODIUM 75 MG/1
75 TABLET, DELAYED RELEASE ORAL 2 TIMES DAILY
COMMUNITY
Start: 2019-07-05 | End: 2020-08-18

## 2020-08-18 RX ORDER — ATORVASTATIN CALCIUM 20 MG/1
TABLET, FILM COATED ORAL
COMMUNITY
Start: 2020-08-11

## 2020-08-18 RX ORDER — CITALOPRAM 10 MG/1
TABLET ORAL
COMMUNITY
Start: 2020-08-11

## 2020-09-27 ENCOUNTER — TELEPHONE (OUTPATIENT)
Dept: OBGYN CLINIC | Age: 70
End: 2020-09-27

## 2020-09-27 NOTE — TELEPHONE ENCOUNTER
Received a fax from [employer] [id number] stating Premarin vaginal cream copay is over $400--states please send alternative.

## 2020-09-28 NOTE — TELEPHONE ENCOUNTER
09/28/20  10:02 tried phone call out to patient at Home/mobile number. Phone VM has not been set up yet. Alejandra Santana pending.

## 2020-09-28 NOTE — TELEPHONE ENCOUNTER
She needs to check her formulary to see what coverage she has for vaginal estrogen. Happy to prescribe whatever she has coverage for. May also want to check [facility]. Options typically include cream (premarin or estrace), vag tablet (Vagifem, should now be avail as generic), vag capsule (Imvexxy), vag ring (Estring). Another option is DHEA suppository (Intrarosa). Could also do vaginal estrogen through compounding pharmacy.

## 2020-09-28 NOTE — TELEPHONE ENCOUNTER
Message left at 814am  78year old patient last seen in the office on 8/18/2019 and has upcoming appointment on 10/12/2020    Patient left a message returning a call from the office. This nurse called the patient back and advised of MD recommendations and patient would like to try the Intrarosa and will check with her insurance company. Patient will discuss with MD at her upcoming visit. Patient verbalized understanding.

## 2020-10-12 ENCOUNTER — OFFICE VISIT (OUTPATIENT)
Dept: OBGYN CLINIC | Age: 70
End: 2020-10-12
Payer: MEDICARE

## 2020-10-12 VITALS — SYSTOLIC BLOOD PRESSURE: 139 MMHG | BODY MASS INDEX: 23.96 KG/M2 | DIASTOLIC BLOOD PRESSURE: 89 MMHG | WEIGHT: 131 LBS

## 2020-10-12 DIAGNOSIS — N95.1 VAGINAL DRYNESS, MENOPAUSAL: ICD-10-CM

## 2020-10-12 DIAGNOSIS — Z01.419 ENCOUNTER FOR GYNECOLOGICAL EXAMINATION: Primary | ICD-10-CM

## 2020-10-12 PROCEDURE — G8420 CALC BMI NORM PARAMETERS: HCPCS | Performed by: OBSTETRICS & GYNECOLOGY

## 2020-10-12 PROCEDURE — 1090F PRES/ABSN URINE INCON ASSESS: CPT | Performed by: OBSTETRICS & GYNECOLOGY

## 2020-10-12 PROCEDURE — 1101F PT FALLS ASSESS-DOCD LE1/YR: CPT | Performed by: OBSTETRICS & GYNECOLOGY

## 2020-10-12 PROCEDURE — G0101 CA SCREEN;PELVIC/BREAST EXAM: HCPCS | Performed by: OBSTETRICS & GYNECOLOGY

## 2020-10-12 PROCEDURE — G9899 SCRN MAM PERF RSLTS DOC: HCPCS | Performed by: OBSTETRICS & GYNECOLOGY

## 2020-10-12 PROCEDURE — G8510 SCR DEP NEG, NO PLAN REQD: HCPCS | Performed by: OBSTETRICS & GYNECOLOGY

## 2020-10-12 PROCEDURE — 3017F COLORECTAL CA SCREEN DOC REV: CPT | Performed by: OBSTETRICS & GYNECOLOGY

## 2020-10-12 RX ORDER — PRASTERONE 6.5 MG/1
1 INSERT VAGINAL
Qty: 90 EACH | Refills: 4 | Status: SHIPPED | OUTPATIENT
Start: 2020-10-12

## 2021-05-21 ENCOUNTER — TRANSCRIBE ORDER (OUTPATIENT)
Dept: REGISTRATION | Age: 71
End: 2021-05-21

## 2021-05-21 ENCOUNTER — HOSPITAL ENCOUNTER (OUTPATIENT)
Dept: LAB | Age: 71
Discharge: HOME | End: 2021-05-21
Payer: MEDICARE

## 2021-05-21 DIAGNOSIS — Z01.812 PRE-PROCEDURAL LABORATORY EXAMINATIONS: Primary | ICD-10-CM

## 2021-05-21 DIAGNOSIS — Z01.812 PRE-PROCEDURAL LABORATORY EXAMINATIONS: ICD-10-CM

## 2021-05-21 PROCEDURE — U0005 INFEC AGEN DETEC AMPLI PROBE: HCPCS

## 2021-05-21 NOTE — PROGRESS NOTES
[address]  Endoscopy Preprocedure Instructions      1. On the day of your surgery, please report to registration located on the 2nd floor of the  [facility]. yes    2. You must have a responsible adult to drive you to the hospital, stay at the hospital during your procedure and drive you home. If they leave your procedure will not be started (no exceptions). yes    3. Do not have anything to eat or drink (including water, gum, mints, coffee, and juice) after midnight. This does not apply to the medications you were instructed to take by your physician. yesIf you are currently taking Plavix, Coumadin, Aspirin, or other blood-thinning agents, contact your physician for special instructions. yes,    4. If you are having a procedure that requires bowel prep: We recommend that you have only clear liquids the day before your procedure and begin your bowel prep by 5:00 pm.  You may continue to drink clear liquids until midnight. If for any reason you are not able to complete your prep please notify your physician immediately. yes    5. Have a list of all current medications, including vitamins, herbal supplements and any other over the counter medications. yes    6. If you wear glasses, contacts, dentures and/or hearing aids, they may be removed prior to procedure, please bring a case to store them in. yes    7. You should understand that if you do not follow these instructions your procedure may be cancelled. If your physical condition changes (I.e. fever, cold or flu) please contact your doctor as soon as possible. 8. It is important that you be on time.   If for any reason you are unable to keep your appointment please call )- the day of or your physicians office prior to your scheduled procedure

## 2021-05-22 LAB — SARS-COV-2, COV2NT: NOT DETECTED

## 2021-05-25 ENCOUNTER — ANESTHESIA (OUTPATIENT)
Dept: ENDOSCOPY | Age: 71
End: 2021-05-25
Payer: MEDICARE

## 2021-05-25 ENCOUNTER — ANESTHESIA EVENT (OUTPATIENT)
Dept: ENDOSCOPY | Age: 71
End: 2021-05-25
Payer: MEDICARE

## 2021-05-25 ENCOUNTER — HOSPITAL ENCOUNTER (OUTPATIENT)
Age: 71
Setting detail: OUTPATIENT SURGERY
Discharge: HOME | End: 2021-05-25
Attending: INTERNAL MEDICINE | Admitting: INTERNAL MEDICINE
Payer: MEDICARE

## 2021-05-25 VITALS
SYSTOLIC BLOOD PRESSURE: 120 MMHG | WEIGHT: 133.38 LBS | HEART RATE: 64 BPM | OXYGEN SATURATION: 100 % | RESPIRATION RATE: 20 BRPM | HEIGHT: 62 IN | DIASTOLIC BLOOD PRESSURE: 80 MMHG | TEMPERATURE: 98.3 F | BODY MASS INDEX: 24.54 KG/M2

## 2021-05-25 PROCEDURE — 2709999900 HC NON-CHARGEABLE SUPPLY: Performed by: INTERNAL MEDICINE

## 2021-05-25 PROCEDURE — 74011250636 HC RX REV CODE- 250/636: Performed by: NURSE ANESTHETIST, CERTIFIED REGISTERED

## 2021-05-25 PROCEDURE — 76040000019: Performed by: INTERNAL MEDICINE

## 2021-05-25 PROCEDURE — 76060000031 HC ANESTHESIA FIRST 0.5 HR: Performed by: INTERNAL MEDICINE

## 2021-05-25 RX ORDER — NALOXONE HYDROCHLORIDE 0.4 MG/ML
0.4 INJECTION, SOLUTION INTRAMUSCULAR; INTRAVENOUS; SUBCUTANEOUS
Status: DISCONTINUED | OUTPATIENT
Start: 2021-05-25 | End: 2021-05-25 | Stop reason: HOSPADM

## 2021-05-25 RX ORDER — DEXTROMETHORPHAN/PSEUDOEPHED 2.5-7.5/.8
1.2 DROPS ORAL
Status: DISCONTINUED | OUTPATIENT
Start: 2021-05-25 | End: 2021-05-25 | Stop reason: HOSPADM

## 2021-05-25 RX ORDER — ATROPINE SULFATE 0.1 MG/ML
0.4 INJECTION INTRAVENOUS
Status: DISCONTINUED | OUTPATIENT
Start: 2021-05-25 | End: 2021-05-25 | Stop reason: HOSPADM

## 2021-05-25 RX ORDER — FLUMAZENIL 0.1 MG/ML
0.2 INJECTION INTRAVENOUS
Status: DISCONTINUED | OUTPATIENT
Start: 2021-05-25 | End: 2021-05-25 | Stop reason: HOSPADM

## 2021-05-25 RX ORDER — PROPOFOL 10 MG/ML
INJECTION, EMULSION INTRAVENOUS AS NEEDED
Status: DISCONTINUED | OUTPATIENT
Start: 2021-05-25 | End: 2021-05-25 | Stop reason: HOSPADM

## 2021-05-25 RX ORDER — SODIUM CHLORIDE 9 MG/ML
INJECTION, SOLUTION INTRAVENOUS
Status: DISCONTINUED | OUTPATIENT
Start: 2021-05-25 | End: 2021-05-25 | Stop reason: HOSPADM

## 2021-05-25 RX ORDER — PROPOFOL 10 MG/ML
INJECTION, EMULSION INTRAVENOUS
Status: DISCONTINUED | OUTPATIENT
Start: 2021-05-25 | End: 2021-05-25 | Stop reason: HOSPADM

## 2021-05-25 RX ORDER — EPINEPHRINE 0.1 MG/ML
1 INJECTION INTRACARDIAC; INTRAVENOUS
Status: DISCONTINUED | OUTPATIENT
Start: 2021-05-25 | End: 2021-05-25 | Stop reason: HOSPADM

## 2021-05-25 RX ORDER — SODIUM CHLORIDE 9 MG/ML
50 INJECTION, SOLUTION INTRAVENOUS CONTINUOUS
Status: DISCONTINUED | OUTPATIENT
Start: 2021-05-25 | End: 2021-05-25 | Stop reason: HOSPADM

## 2021-05-25 RX ORDER — MIDAZOLAM HYDROCHLORIDE 1 MG/ML
.25-5 INJECTION, SOLUTION INTRAMUSCULAR; INTRAVENOUS
Status: DISCONTINUED | OUTPATIENT
Start: 2021-05-25 | End: 2021-05-25 | Stop reason: HOSPADM

## 2021-05-25 RX ADMIN — PROPOFOL INJECTABLE EMULSION 100 MCG/KG/MIN: 10 INJECTION, EMULSION INTRAVENOUS at 09:52

## 2021-05-25 RX ADMIN — SODIUM CHLORIDE: 9 INJECTION, SOLUTION INTRAVENOUS at 09:40

## 2021-05-25 RX ADMIN — PROPOFOL INJECTABLE EMULSION 20 MG: 10 INJECTION, EMULSION INTRAVENOUS at 09:54

## 2021-05-25 RX ADMIN — PROPOFOL INJECTABLE EMULSION 80 MG: 10 INJECTION, EMULSION INTRAVENOUS at 09:52

## 2021-05-25 NOTE — PROCEDURES
Liset Balbuena M.D.  [phone]            2021          Colonoscopy Operative Report  Johanna Pizano  :  1950  [facility] Medical Record Number:  [id number]      Indications:    Screening colonoscopy     :  Patel Bartlett MD    Assistant -- None  Implants -- None    Referring Provider: Dorian Kirk NP    Sedation:  MAC anesthesia Propofol    Pre-Procedural Exam:      Airway: clear,  No airway problems anticipated  Heart: RRR, without gallops or rubs  Lungs: clear bilaterally without wheezes, crackles, or rhonchi  Abdomen: soft, nontender, nondistended, bowel sounds present  Mental Status: awake, alert and oriented to person, place and time     Procedure Details:  After informed consent was obtained with all risks and benefits of procedure explained and preoperative exam completed, the patient was taken to the endoscopy suite and placed in the left lateral decubitus position. Upon sequential sedation as per above, a digital rectal exam was performed. The Olympus videocolonoscope  was inserted in the rectum and carefully advanced to the terminal ileum. The quality of preparation was good. The colonoscope was slowly withdrawn with careful inspection and evaluation between folds. Retroflexion in the rectum was performed. Findings:   Terminal Ileum: normal  Cecum: normal  Ascending Colon: normal  Transverse Colon: normal  Descending Colon: normal  Sigmoid:     - Diverticulosis  Rectum: normal    Interventions:  none    Specimen Removed:  none    Complications: None. EBL:  None. Impression:  Mild sigmoid diverticulosis, otherwise normal    Recommendations:  -No further colonoscopies for cancer screening required due to age      Discharge Disposition:  Home in the company of a  when able to ambulate.     Patel Bartlett MD  2021  10:08 AM

## 2021-05-25 NOTE — ANESTHESIA PREPROCEDURE EVALUATION
Relevant Problems   No relevant active problems       Anesthetic History   No history of anesthetic complications            Review of Systems / Medical History  Patient summary reviewed and pertinent labs reviewed    Pulmonary  Within defined limits                 Neuro/Psych         Headaches and psychiatric history     Cardiovascular                  Exercise tolerance: >4 METS     GI/Hepatic/Renal  Within defined limits              Endo/Other  Within defined limits           Other Findings            Physical Exam    Airway  Mallampati: II  TM Distance: 4 - 6 cm  Neck ROM: normal range of motion   Mouth opening: Normal     Cardiovascular    Rhythm: regular  Rate: normal         Dental    Dentition: Caps/crowns     Pulmonary  Breath sounds clear to auscultation               Abdominal  GI exam deferred       Other Findings            Anesthetic Plan    ASA: 2  Anesthesia type: MAC          Induction: Intravenous  Anesthetic plan and risks discussed with: Patient

## 2021-05-25 NOTE — PERIOP NOTES
6856 Procedure being performed under MAC; SHEA Pastor at bedside monitoring patient. See anesthesia notes. 200 Endoscope was pre-cleaned at bedside immediately following procedure by Viri Benson. 1010 Patient received Propofol, per SHEA Pastor. Care of patient assumed from the anesthesia provider. Patient tolerated procedure well. Abdomen remains soft and non tender post procedure, no complaints or indication of discomfort noted at this time. See anesthesia note. Patient transferred to Endoscopy Recovery and report given to recovery nurse.

## 2021-05-25 NOTE — H&P
Luisito Yi M.D.  [phone]            History and Physical       NAME:  Leonidas Jackson   :   1950   MRN:   [id number]       Referring Physician:  Shahida Lai NP      Consult Date: 2021 7:55 AM    Chief Complaint:  Colon cancer screening    History of Present Illness:  Patient is a 69 y.o. who is seen for colon cancer screening. Denies any ongoing GI complaints. PMH:  Past Medical History:   Diagnosis Date    Hx of bone density study 3/12/12    Normal    Hx of bone density study 2012    WNL     Hx of colonoscopy     Hx of mammogram 10/12/2020    BI-RADS 2: Benign.  Ill-defined condition     high cholesterol    Panic attacks     Pap smear for cervical cancer screening 3/22/13    Negative, HPV Negative    Vitamin D deficiency        PSH:  Past Surgical History:   Procedure Laterality Date    HX BREAST AUGMENTATION Bilateral     HX DILATION AND CURETTAGE  2001    HX LAPAROTOMY      Endometriosis,partial resection of ovaries bilaterally    HX ORTHOPAEDIC Left     accident caused a tear in tendon of foot    Phillip 1466    Dx'd w/ e'osis--Laparoscopic Diagnostic       Allergies: Allergies   Allergen Reactions    Demerol [Meperidine] Other (comments)     BP drops    Pcn [Penicillins] Rash       Home Medications:  Cannot display prior to admission medications because the patient has not been admitted in this contact. Hospital Medications:  No current facility-administered medications for this encounter. Current Outpatient Medications   Medication Sig    citalopram (CELEXA) 10 mg tablet TAKE 1 TABLET BY MOUTH EVERY DAY    atorvastatin (LIPITOR) 20 mg tablet TAKE 1 TABLET BY MOUTH EVERY DAY    coenzyme q10 (CO Q-10) 10 mg cap Take  by mouth.  naproxen sodium (ALEVE) 220 mg tablet Take 220 mg by mouth two (2) times daily as needed.  aspirin delayed-release 81 mg tablet Take 81 mg by mouth daily.     cholecalciferol, vitamin D3, 2,000 unit tab Take 1 Tab by mouth daily.  clonazePAM (KLONOPIN) 0.5 mg tablet Take 0.5 mg by mouth nightly.  prasterone, dhea, (Intrarosa) 6.5 mg inst Insert 1 Suppository into vagina nightly. (Patient not taking: Reported on 5/21/2021)       Social History:  Social History     Tobacco Use    Smoking status: Never Smoker    Smokeless tobacco: Never Used    Tobacco comment: Never used vapor or e-cigs    Substance Use Topics    Alcohol use: No     Alcohol/week: 0.0 standard drinks       Family History:  Family History   Problem Relation Age of Onset    Colon Cancer Paternal Grandmother 58    Hypertension Father     Pancreatic Cancer Maternal Grandmother         late 62s             Review of Systems:      Constitutional: negative fever, negative chills, negative weight loss  Eyes:   negative visual changes  ENT:   negative sore throat, tongue or lip swelling  Respiratory:  negative cough, negative dyspnea  Cards:  negative for chest pain, palpitations, lower extremity edema  GI:   See HPI  :  negative for frequency, dysuria  Integument:  negative for rash and pruritus  Heme:  negative for easy bruising and gum/nose bleeding  Musculoskel: negative for myalgias,  back pain and muscle weakness  Neuro: negative for headaches, dizziness, vertigo  Psych:  negative for feelings of anxiety, depression       Objective:   No data found. No intake/output data recorded. No intake/output data recorded. EXAM:     NEURO-a&o   HEENT-wnl   LUNGS-clear    COR-regular rate and rhythym     ABD-soft , no tenderness, no rebound, good bs     EXT-no edema     Data Review     No results for input(s): WBC, HGB, HCT, PLT, HGBEXT, HCTEXT, PLTEXT in the last 72 hours. No results for input(s): NA, K, CL, CO2, BUN, CREA, GLU, PHOS, CA in the last 72 hours. No results for input(s): AP, TBIL, TP, ALB, GLOB, GGT, AML, LPSE in the last 72 hours.     No lab exists for component: SGOT, GPT, AMYP, HLPSE  No results for input(s): INR, PTP, APTT, INREXT in the last 72 hours. There is no problem list on file for this patient. Assessment:   · Colon cancer screening  ·    Plan:   · Colonoscopy today.      Signed By: Alayna Condon MD     5/25/2021  7:55 AM

## 2021-05-25 NOTE — ROUTINE PROCESS
Dana Martha 
1950 
[id number] Situation: 
Verbal report received from: Gardenia Chisholm Procedure: Procedure(s): 
COLONOSCOPY Background: 
 
Preoperative diagnosis: SCREENING Postoperative diagnosis: Couple diverticulosis :  Dr. Kimberlee Mercado Assistant(s): Endoscopy RN-1: Ashwini Michelle RN Endoscopy RN-2: Laya Hebert RN Specimens: * No specimens in log * H. Pylori  no Assessment: 
Intra-procedure medications Anesthesia gave intra-procedure sedation and medications, see anesthesia flow sheet yes Intravenous fluids: NS@ [facility] Vital signs stable Abdominal assessment: round and soft Recommendation: 
Discharge patient per MD order. Family  Permission to share finding with family or friend yes Endoscopy discharge instructions have been reviewed and given to patient. The patient verbalized understanding and acceptance of instructions. Dr. Kimberlee Mercado discussed with patient procedure findings and next steps.

## 2021-05-25 NOTE — ANESTHESIA POSTPROCEDURE EVALUATION
Procedure(s):  COLONOSCOPY. MAC    Anesthesia Post Evaluation      Multimodal analgesia: multimodal analgesia not used between 6 hours prior to anesthesia start to PACU discharge  Patient location during evaluation: PACU  Patient participation: complete - patient participated  Level of consciousness: awake and alert  Pain score: 0  Pain management: satisfactory to patient  Airway patency: patent  Anesthetic complications: no  Cardiovascular status: acceptable  Respiratory status: acceptable  Hydration status: acceptable  Post anesthesia nausea and vomiting:  none  Final Post Anesthesia Temperature Assessment:  Normothermia (36.0-37.5 degrees C)      INITIAL Post-op Vital signs:   Vitals Value Taken Time   BP     Temp     Pulse 70 05/25/21 1016   Resp 19 05/25/21 1016   SpO2 100 % 05/25/21 1016   Vitals shown include unvalidated device data.

## 2021-05-25 NOTE — DISCHARGE INSTRUCTIONS
[address] [facility]. Alondra Ramos M.D.  [phone]          COLON DISCHARGE INSTRUCTIONS       2021    Sana Murphy  :  1950  David Medical Record Number:  [id number]      COLONOSCOPY FINDINGS:  Your colonoscopy showed: mild diverticulosis, otherwise normal exam.    DISCOMFORT:  Redness at IV site- apply warm compress to area; if redness or soreness persist- contact your physician  There may be a slight amount of blood passed from the rectum  Gaseous discomfort- walking, belching will help relieve any discomfort  You may not operate a vehicle for 12 hours  You may not engage in an occupation involving machinery or appliances for rest of today  You may not drink alcoholic beverages for at least 12 hours  Avoid making any critical decisions for at least 24 hour  DIET:   High fiber diet. - however -  remember your colon is empty and a heavy meal will produce gas. Avoid these foods:  vegetables, fried / greasy foods, carbonated drinks for today     ACTIVITY:  You may resume your normal daily activities it is recommended that you spend the remainder of the day resting -  avoid any strenuous activity. CALL M.D. ANY SIGN OF:   Increasing pain, nausea, vomiting  Abdominal distension (swelling)  New increased bleeding (oral or rectal)  Fever (chills)  Pain in chest area  Bloody discharge from nose or mouth   Shortness of breath    Follow-up Instructions:   Call Dr. Hall Patient if any questions or problems.    Telephone # [phone]  Biopsy results will be available in  5 to 7 days  -No further colonoscopies for cancer screening required due to age

## 2022-11-03 ENCOUNTER — OFFICE VISIT (OUTPATIENT)
Dept: OBGYN CLINIC | Age: 72
End: 2022-11-03
Payer: MEDICARE

## 2022-11-03 VITALS — SYSTOLIC BLOOD PRESSURE: 149 MMHG | BODY MASS INDEX: 25.06 KG/M2 | DIASTOLIC BLOOD PRESSURE: 82 MMHG | WEIGHT: 137 LBS

## 2022-11-03 DIAGNOSIS — R35.0 URINE FREQUENCY: Primary | ICD-10-CM

## 2022-11-03 DIAGNOSIS — N95.8 GENITOURINARY SYNDROME OF MENOPAUSE: ICD-10-CM

## 2022-11-03 PROBLEM — Z12.4 PAP SMEAR FOR CERVICAL CANCER SCREENING: Status: ACTIVE | Noted: 2022-11-03

## 2022-11-03 LAB
BILIRUB UR QL STRIP: NORMAL
GLUCOSE UR-MCNC: NEGATIVE MG/DL
KETONES P FAST UR STRIP-MCNC: NEGATIVE MG/DL
PH BODY FLUID, POCT, PHBFPOCT: 4.5
PH UR STRIP: 6.5 [PH] (ref 4.6–8)
PROT UR QL STRIP: NORMAL
SOURCE POCT, SRCEPOCT: NORMAL
SP GR UR STRIP: 1.01 (ref 1–1.03)
UA UROBILINOGEN AMB POC: NORMAL (ref 0.2–1)
URINALYSIS CLARITY POC: NORMAL
URINALYSIS COLOR POC: YELLOW
URINE BLOOD POC: NORMAL
URINE LEUKOCYTES POC: NORMAL
URINE NITRITES POC: NEGATIVE
WET MOUNT POCT, WMPOCT: NEGATIVE
WET MOUNT POCT, WMPOCT: NEGATIVE

## 2022-11-03 PROCEDURE — 1090F PRES/ABSN URINE INCON ASSESS: CPT | Performed by: OBSTETRICS & GYNECOLOGY

## 2022-11-03 PROCEDURE — G8399 PT W/DXA RESULTS DOCUMENT: HCPCS | Performed by: OBSTETRICS & GYNECOLOGY

## 2022-11-03 PROCEDURE — 99214 OFFICE O/P EST MOD 30 MIN: CPT | Performed by: OBSTETRICS & GYNECOLOGY

## 2022-11-03 PROCEDURE — 3017F COLORECTAL CA SCREEN DOC REV: CPT | Performed by: OBSTETRICS & GYNECOLOGY

## 2022-11-03 PROCEDURE — 83986 ASSAY PH BODY FLUID NOS: CPT | Performed by: OBSTETRICS & GYNECOLOGY

## 2022-11-03 PROCEDURE — 1101F PT FALLS ASSESS-DOCD LE1/YR: CPT | Performed by: OBSTETRICS & GYNECOLOGY

## 2022-11-03 PROCEDURE — G9899 SCRN MAM PERF RSLTS DOC: HCPCS | Performed by: OBSTETRICS & GYNECOLOGY

## 2022-11-03 PROCEDURE — G8536 NO DOC ELDER MAL SCRN: HCPCS | Performed by: OBSTETRICS & GYNECOLOGY

## 2022-11-03 PROCEDURE — 87210 SMEAR WET MOUNT SALINE/INK: CPT | Performed by: OBSTETRICS & GYNECOLOGY

## 2022-11-03 PROCEDURE — G8427 DOCREV CUR MEDS BY ELIG CLIN: HCPCS | Performed by: OBSTETRICS & GYNECOLOGY

## 2022-11-03 PROCEDURE — 1123F ACP DISCUSS/DSCN MKR DOCD: CPT | Performed by: OBSTETRICS & GYNECOLOGY

## 2022-11-03 PROCEDURE — G8417 CALC BMI ABV UP PARAM F/U: HCPCS | Performed by: OBSTETRICS & GYNECOLOGY

## 2022-11-03 PROCEDURE — 81003 URINALYSIS AUTO W/O SCOPE: CPT | Performed by: OBSTETRICS & GYNECOLOGY

## 2022-11-03 PROCEDURE — G8432 DEP SCR NOT DOC, RNG: HCPCS | Performed by: OBSTETRICS & GYNECOLOGY

## 2022-11-03 RX ORDER — NITROFURANTOIN 25; 75 MG/1; MG/1
100 CAPSULE ORAL 2 TIMES DAILY
Qty: 14 CAPSULE | Refills: 0 | Status: SHIPPED | OUTPATIENT
Start: 2022-11-03 | End: 2022-11-10

## 2022-11-03 RX ORDER — LOSARTAN POTASSIUM 25 MG/1
25 TABLET ORAL AS NEEDED
COMMUNITY
Start: 2022-08-04

## 2022-11-03 RX ORDER — ESTRADIOL 0.1 MG/G
0.5 CREAM VAGINAL
Qty: 42.5 G | Refills: 0 | Status: SHIPPED | OUTPATIENT
Start: 2022-11-03

## 2022-11-03 NOTE — PROGRESS NOTES
UTI note    Brooklynn Huang is a ,  67 y.o. female WHITE/NON- who presents today with had concerns including Urinary Frequency. .     Her symptoms started couple months ago. Has been seen twice in her primary care office. Apparently had a low level of bacteria. Told not typically treated however she was treated with Macrobid twice and did notice improvement in her symptoms. Discomfort is in the suprapubic area and does not radiate. Symptoms are not alleviated by hydration. Symptoms are exacerbated with activity. Occasionally applies Monistat to the urethral opening. Patient's other complaints:  bladder/pelvic tenderness and vaginal dryness . Her symptoms are moderate. Patient denies back pain. There is not any concern of sexual abuse. There is not a history of trauma to the genital area. No fever, chills, nausea or vomiting. Patient does not have a history of recurrent UTI. She does not have a history of pyelonephritis. Also reports vulvovaginal dryness. Recently she was on BHRT. She self DC'd this a couple of years ago. She did re-consult that her. Recommended that she not resume BHRT and she had been off of it for a couple of years. However they did recommend Intrarosa. She has been using this with some improvement. She has a history of  has a past medical history of High blood cholesterol, bone density study (2012), colonoscopy, mammogram (10/12/2020), Panic attacks, Pap smear for cervical cancer screening (2019), and Vitamin D deficiency. She has no past medical history of Adverse effect of anesthesia, Diabetes ([address] Difficult intubation, Malignant hyperthermia due to anesthesia, Nausea & vomiting, Pseudocholinesterase deficiency, or Sleep apnea.  with the following surgical history  has a past surgical history that includes hx breast augmentation (Bilateral); hx dilation and curettage (2001); hx laparotomy (); hx other surgical (); hx orthopaedic (Left); and colonoscopy (N/A, 5/25/2021). She has not been evaluated for her current complaints. Past Medical History:   Diagnosis Date    High blood cholesterol     high cholesterol    Hx of bone density study 03/12/2012    Normal    Hx of colonoscopy     Hx of mammogram 10/12/2020    BI-RADS 2: Benign. Panic attacks     Pap smear for cervical cancer screening 04/09/2019    Negative, HPV not indicated    Vitamin D deficiency      Past Surgical History:   Procedure Laterality Date    COLONOSCOPY N/A 5/25/2021    COLONOSCOPY performed by Reji Vinson MD at [facility] END[facility]COPY    HX BREAST AUGMENTATION Bilateral     HX DILATION AND CURETTAGE  07/2001    HX LAPAROTOMY  1986    Endometriosis,partial resection of ovaries bilaterally    HX ORTHOPAEDIC Left     accident caused a tear in tendon of foot    HX [address]    Dx'd w/ e'osis--Laparoscopic Diagnostic     Social History     Occupational History    Not on file   Tobacco Use    Smoking status: Never    Smokeless tobacco: Never    Tobacco comments:     Never used vapor or e-cigs    Substance and Sexual Activity    Alcohol use: No     Alcohol/week: 0.0 standard drinks    Drug use: No    Sexual activity: Yes     Partners: Male     Birth control/protection: None     Family History   Problem Relation Age of Onset    Colon Cancer Paternal Grandmother 58    Hypertension Father     Pancreatic Cancer Maternal Grandmother         late 62s       Allergies   Allergen Reactions    Demerol [Meperidine] Other (comments)     BP drops    Pcn [Penicillins] Rash     Prior to Admission medications    Medication Sig Start Date End Date Taking? Authorizing Provider   losartan (COZAAR) 25 mg tablet Take 25 mg by mouth as needed. 8/4/22  Yes Provider, Historical   nitrofurantoin, macrocrystal-monohydrate, (MACROBID) 100 mg capsule Take 1 Capsule by mouth two (2) times a day for 7 days.  11/3/22 11/10/22 Yes Cecilia Vickers MD   estradioL (ESTRACE) 0.01 % (0.1 mg/gram) vaginal cream Insert 0.5 g into vagina two (2) days a week. 11/3/22  Yes Alex Smith MD   citalopram (CELEXA) 10 mg tablet TAKE 1 TABLET BY MOUTH EVERY DAY 8/11/20  Yes Provider, Historical   atorvastatin (LIPITOR) 20 mg tablet TAKE 1 TABLET BY MOUTH EVERY DAY 8/11/20  Yes Provider, Historical   coenzyme q10 10 mg cap Take  by mouth. Yes Provider, Historical   aspirin delayed-release 81 mg tablet Take 81 mg by mouth daily. Yes Other, MD Anne   cholecalciferol, vitamin D3, 2,000 unit tab Take 1 Tab by mouth daily. Yes Other, MD Anne   clonazePAM (KLONOPIN) 0.5 mg tablet Take 0.5 mg by mouth nightly. 7/13/14  Yes Provider, Historical   prasterone, dhea, (Intrarosa) 6.5 mg inst Insert 1 Suppository into vagina nightly. Patient not taking: No sig reported 10/12/20   Alex Smith MD   naproxen sodium (NAPROSYN) 220 mg tablet Take 220 mg by mouth two (2) times daily as needed.   Patient not taking: Reported on 11/3/2022    Provider, Historical              Objective:  Visit Vitals  BP (!) 149/82   Wt 137 lb (62.1 kg)   BMI 25.06 kg/m²       Physical Exam:   PHYSICAL EXAMINATION    Constitutional  Appearance: well-nourished, well developed, alert, in no acute distress    Back  No CVA or flank tenderness      Gastrointestinal  Abdominal Examination: abdomen non-tender to palpation, normal bowel sounds, no masses present  Liver and spleen: no hepatomegaly present, spleen not palpable  Hernias: no hernias identified    Genitourinary  External Genitalia: normal appearance for age, no discharge present, no tenderness present, no inflammatory lesions present, no masses present, moderate atrophy present  Vagina: normal vaginal vault without central or paravaginal defects, scant thin white discharge present, no inflammatory lesions present, no masses present  Bladder: tender to palpation  Urethra: appears normal  Cervix: normal   Uterus: normal size, shape and consistency  Adnexa: no adnexal tenderness present, no adnexal masses present  Perineum: perineum within normal limits, no evidence of trauma, no rashes or skin lesions present  Anus: anus within normal limits, no hemorrhoids present  Inguinal Lymph Nodes: no lymphadenopathy present    Skin  General Inspection: no rash, no lesions identified    Neurologic/Psychiatric  Mental Status:  Orientation: grossly oriented to person, place and time  Mood and Affect: mood normal, affect appropriate      Results for orders placed or performed in visit on 11/03/22   AMB POC URINALYSIS DIP STICK AUTO W/O MICRO   Result Value Ref Range    Color (UA POC) Yellow     Clarity (UA POC) Cloudy     Glucose (UA POC) Negative Negative    Bilirubin (UA POC) 1+ Negative    Ketones (UA POC) Negative Negative    Specific gravity (UA POC) 1.015 1.001 - 1.035    Blood (UA POC) 1+ Negative    pH (UA POC) 6.5 4.6 - 8.0    Protein (UA POC) Trace Negative    Urobilinogen (UA POC) 0.2 mg/dL 0.2 - 1    Nitrites (UA POC) Negative Negative    Leukocyte esterase (UA POC) 2+ Negative   AMB POC PH, BODY FLUID EXCEPT BLOOD   Result Value Ref Range    pH,Body fluid (POC) 4.5     Source     AMB POC SMEAR, STAIN & INTERPRET, WET MOUNT   Result Value Ref Range    Wet mount (POC) negative    AMB POC SMEAR, STAIN & INTERPRET, WET MOUNT   Result Value Ref Range    Wet mount (POC) negative          Assessment:   ? UTI - frequency, urgency, hematuria  GSM. Plan:   Rx: macrobid  UCx  If recurrent UTI/urinary sx, rec urology consult  Discussed alternatives to Intrarosa.   Would like to try vaginal estrace    Orders Placed This Encounter    CULTURE, URINE     Standing Status:   Future     Number of Occurrences:   1     Standing Expiration Date:   11/3/2023    AMB POC URINALYSIS DIP STICK AUTO W/O MICRO    AMB POC PH, BODY FLUID EXCEPT BLOOD    AMB POC SMEAR, STAIN & INTERPRET, WET MOUNT    AMB POC SMEAR, STAIN & INTERPRET, WET MOUNT    nitrofurantoin, macrocrystal-monohydrate, (MACROBID) 100 mg capsule Sig: Take 1 Capsule by mouth two (2) times a day for 7 days. Dispense:  14 Capsule     Refill:  0    estradioL (ESTRACE) 0.01 % (0.1 mg/gram) vaginal cream     Sig: Insert 0.5 g into vagina two (2) days a week.      Dispense:  42.5 g     Refill:  0

## 2022-11-07 LAB
BACTERIA SPEC CULT: ABNORMAL
CC UR VC: ABNORMAL
SERVICE CMNT-IMP: ABNORMAL

## 2022-12-03 PROBLEM — Z12.4 PAP SMEAR FOR CERVICAL CANCER SCREENING: Status: RESOLVED | Noted: 2022-11-03 | Resolved: 2022-12-03

## 2023-01-24 ENCOUNTER — OFFICE VISIT (OUTPATIENT)
Dept: OBGYN CLINIC | Age: 73
End: 2023-01-24

## 2023-01-24 VITALS
DIASTOLIC BLOOD PRESSURE: 88 MMHG | SYSTOLIC BLOOD PRESSURE: 168 MMHG | HEART RATE: 77 BPM | BODY MASS INDEX: 25.06 KG/M2 | WEIGHT: 137 LBS

## 2023-01-24 DIAGNOSIS — Z12.4 SCREENING FOR CERVICAL CANCER: ICD-10-CM

## 2023-01-24 DIAGNOSIS — N39.0 RECURRENT UTI: ICD-10-CM

## 2023-01-24 DIAGNOSIS — Z71.1 WORRIED WELL: ICD-10-CM

## 2023-01-24 DIAGNOSIS — Z01.419 ENCOUNTER FOR WELL WOMAN EXAM: Primary | ICD-10-CM

## 2023-01-24 LAB
BILIRUB UR QL STRIP: NEGATIVE
GLUCOSE UR-MCNC: NEGATIVE MG/DL
KETONES P FAST UR STRIP-MCNC: NEGATIVE MG/DL
PH UR STRIP: 6 [PH] (ref 4.6–8)
PROT UR QL STRIP: NEGATIVE
SP GR UR STRIP: 1.01 (ref 1–1.03)
UA UROBILINOGEN AMB POC: NORMAL (ref 0.2–1)
URINALYSIS CLARITY POC: CLEAR
URINALYSIS COLOR POC: YELLOW
URINE BLOOD POC: NEGATIVE
URINE LEUKOCYTES POC: NEGATIVE
URINE NITRITES POC: NEGATIVE

## 2023-01-24 PROCEDURE — 1090F PRES/ABSN URINE INCON ASSESS: CPT | Performed by: OBSTETRICS & GYNECOLOGY

## 2023-01-24 PROCEDURE — G9899 SCRN MAM PERF RSLTS DOC: HCPCS | Performed by: OBSTETRICS & GYNECOLOGY

## 2023-01-24 PROCEDURE — G8432 DEP SCR NOT DOC, RNG: HCPCS | Performed by: OBSTETRICS & GYNECOLOGY

## 2023-01-24 PROCEDURE — 3017F COLORECTAL CA SCREEN DOC REV: CPT | Performed by: OBSTETRICS & GYNECOLOGY

## 2023-01-24 PROCEDURE — G0101 CA SCREEN;PELVIC/BREAST EXAM: HCPCS | Performed by: OBSTETRICS & GYNECOLOGY

## 2023-01-24 PROCEDURE — G8417 CALC BMI ABV UP PARAM F/U: HCPCS | Performed by: OBSTETRICS & GYNECOLOGY

## 2023-01-24 PROCEDURE — 81002 URINALYSIS NONAUTO W/O SCOPE: CPT | Performed by: OBSTETRICS & GYNECOLOGY

## 2023-01-24 PROCEDURE — 1101F PT FALLS ASSESS-DOCD LE1/YR: CPT | Performed by: OBSTETRICS & GYNECOLOGY

## 2023-01-24 RX ORDER — NITROFURANTOIN MACROCRYSTALS 50 MG/1
CAPSULE ORAL
COMMUNITY
Start: 2022-12-27

## 2023-01-24 RX ORDER — ACETAMINOPHEN 325 MG/1
TABLET ORAL
COMMUNITY

## 2023-01-24 NOTE — PROGRESS NOTES
Sudha Oleary is a 67 y.o. female returns for an annual exam     Chief Complaint   Patient presents with    Well Woman       No LMP recorded. Patient is postmenopausal.  Her periods are  nonexistent  in flow. Problems:  chronic UTIs and seeing urology for this. Birth Control: none. Last Pap: normal obtained 2019. She does not have a history of JAMAR 2, 3 or cervical cancer. Last Mammogram: had her mammogram today in our office.

## 2023-01-24 NOTE — PROGRESS NOTES
Per Nursing Note:   No LMP recorded. Patient is postmenopausal.  Her periods are  nonexistent  in flow. Problems:  chronic UTIs and seeing urology for this. Birth Control: none. Last Pap: normal obtained . She does not have a history of JAMAR 2, 3 or cervical cancer. Last Mammogram: had her mammogram today in our office. Annual exam      Jacquie Skaggs is a ,  67 y.o. female   No LMP recorded. Patient is postmenopausal.  AE=10/12/20  LV=11/3/22 GSM, UTI    She presents for her annual checkup. Seen 11/3/2022 for GSM. Had been on Intrarosa. Opted to switch to vaginal Estrace. Recurrent UTI, following with  [facility]. Tx'd with Rocephin injection. Has RX for macrobid prophylaxis. Was advised to use small amt vaginal estrace cream to urethral opening. Mammo: today in our office    Pap Smear:  Her most recent Pap smear was normal, HPV was not indicated, obtained 2019. She does not have a history of abnormal paps. Hormonal status:  Has been on BHRT in past.  Stopped 2020      Sexual history:    She  reports being sexually active and has had partner(s) who are male. She reports using the following method of birth control/protection: None. Past Medical History:   Diagnosis Date    High blood cholesterol     high cholesterol    Hx of bone density study 2012    Normal    Hx of colonoscopy     Hx of mammogram 10/12/2020    BI-RADS 2: Benign.      Panic attacks     Pap smear for cervical cancer screening 2019    Negative, HPV not indicated    Vitamin D deficiency      Past Surgical History:   Procedure Laterality Date    COLONOSCOPY N/A 2021    COLONOSCOPY performed by Hung Michael MD [facility] END[facility]    HX BREAST AUGMENTATION Bilateral     HX DILATION AND CURETTAGE  2001    HX LAPAROTOMY      Endometriosis,partial resection of ovaries bilaterally    HX ORTHOPAEDIC Left     accident caused a tear in tendon of foot    HX OTHER SURGICAL      Dx'd w/ e'osis--Laparoscopic Diagnostic     OB History    Para Term  AB Living   2 1 0 1 1 0   SAB IAB Ectopic Molar Multiple Live Births   1         1      # Outcome Date GA Lbr Julio Cesar/2nd Weight Sex Delivery Anes PTL Lv   2  01 30w0d   M VAGINAL DELI  Y DEC      Birth Comments: Still born/D&C   1 SAB                  Current Outpatient Medications   Medication Sig Dispense Refill    nitrofurantoin (MACRODANTIN) 50 mg capsule TAKE 1 CAPSULE BY MOUTH EVERY DAY FOR PROPHYLAXIS      acetaminophen (TylenoL) 325 mg tablet Take  by mouth every four (4) hours as needed for Pain.      losartan (COZAAR) 25 mg tablet Take 25 mg by mouth as needed. estradioL (ESTRACE) 0.01 % (0.1 mg/gram) vaginal cream Insert 0.5 g into vagina two (2) days a week. 42.5 g 0    citalopram (CELEXA) 10 mg tablet TAKE 1 TABLET BY MOUTH EVERY DAY      atorvastatin (LIPITOR) 20 mg tablet TAKE 1 TABLET BY MOUTH EVERY DAY      coenzyme q10 10 mg cap Take  by mouth. aspirin delayed-release 81 mg tablet Take 81 mg by mouth daily. cholecalciferol, vitamin D3, 2,000 unit tab Take 1 Tab by mouth daily. clonazePAM (KLONOPIN) 0.5 mg tablet Take 0.5 mg by mouth nightly. Allergies: Demerol [meperidine] and Pcn [penicillins]     Tobacco History:  reports that she has never smoked. She has never used smokeless tobacco.  Alcohol Abuse:  reports no history of alcohol use. Drug Abuse:  reports no history of drug use.     Family Medical/Cancer History:   Family History   Problem Relation Age of Onset    Colon Cancer Paternal Grandmother 58    Hypertension Father     Pancreatic Cancer Maternal Grandmother         late 62s        Review of Systems - History obtained from the patient  Constitutional: negative for weight loss, fever, night sweats  HEENT: negative for hearing loss, earache, congestion, snoring, sorethroat  CV: negative for chest pain, palpitations, edema  Resp: negative for cough, shortness of breath, wheezing  GI: negative for change in bowel habits, abdominal pain, black or bloody stools  : negative for frequency, dysuria, hematuria, vaginal discharge  MSK: negative for back pain, joint pain, muscle pain  Breast: negative for breast lumps, nipple discharge, galactorrhea  Skin :negative for itching, rash, hives  Neuro: negative for dizziness, headache, confusion, weakness  Psych: negative for anxiety, depression, change in mood  Heme/lymph: negative for bleeding, bruising, pallor    Physical Exam    Visit Vitals  BP (!) 168/88   Pulse 77   Wt 137 lb (62.1 kg)   BMI 25.06 kg/m²       Constitutional  Appearance: well-nourished, well developed, alert, in no acute distress    HENT  Head and Face: appears normal    Neck  Inspection/Palpation: normal appearance, no masses or tenderness  Lymph Nodes: no lymphadenopathy present  Thyroid: gland size normal, nontender, no nodules or masses present on palpation    Chest  Respiratory Effort: breathing unlabored  Auscultation: normal breath sounds    Cardiovascular  Heart:   Auscultation: regular rate and rhythm without murmur    Breasts  Inspection of Breasts: breasts symmetrical, no skin changes, no discharge present, nipple appearance normal, no skin retraction present  Palpation of Breasts and Axillae: bilat implants, no masses present on palpation, no breast tenderness  Axillary Lymph Nodes: no lymphadenopathy present    Gastrointestinal  Abdominal Examination: abdomen soft, no guarding or rebound, minimal LLQ tenderness to palpation, normal bowel sounds, no masses present  Liver and spleen: no hepatomegaly present, spleen not palpable  Hernias: no hernias identified    Genitourinary  External Genitalia: normal appearance for age, no discharge present, no tenderness present, no inflammatory lesions present, no masses present, atrophy present  Vagina: normal vaginal vault, no discharge present, no inflammatory lesions present, no masses present  Bladder: non-tender to palpation  Urethra: appears normal  Cervix: normal   Uterus: normal size, shape and consistency  Adnexa: no adnexal tenderness present, no adnexal masses present  Perineum: perineum within normal limits, no evidence of trauma, no rashes or skin lesions present  Anus: anus within normal limits, no hemorrhoids present  Inguinal Lymph Nodes: no lymphadenopathy present    Skin  General Inspection: no rash, no lesions identified    Neurologic/Psychiatric  Mental Status:  Orientation: grossly oriented to person, place and time  Mood and Affect: mood normal, affect appropriate    Results for orders placed or performed in visit on 01/24/23   AMB POC URINALYSIS DIP STICK MANUAL W/O MICRO   Result Value Ref Range    Color (UA POC) Yellow     Clarity (UA POC) Clear     Glucose (UA POC) Negative Negative    Bilirubin (UA POC) Negative Negative    Ketones (UA POC) Negative Negative    Specific gravity (UA POC) 1.010 1.001 - 1.035    Blood (UA POC) Negative Negative    pH (UA POC) 6 4.6 - 8.0    Protein (UA POC) Negative Negative    Urobilinogen (UA POC) normal 0.2 - 1    Nitrites (UA POC) Negative Negative    Leukocyte esterase (UA POC) Negative Negative         Assessment & Plan:  Routine gynecologic examination. Pap today. GSM. Has stopped Intrarosa. Adv to cont vaginal estrace 0.5mg 2x/wk, can divide dose b/t vagina and vulva/urethra. Getting RX from urology  Counseled re: diet, exercise, healthy lifestyle  Return for yearly wellness visits  Rec screening mammo. Done today in our office  Recurrent UTI. Followed by urology. UA neg today. BP elevated. Follows with cardiology.   Patient verbalized understanding    Orders Placed This Encounter    AMB POC URINALYSIS DIP STICK MANUAL W/O MICRO    nitrofurantoin (MACRODANTIN) 50 mg capsule     Sig: TAKE 1 CAPSULE BY MOUTH EVERY DAY FOR PROPHYLAXIS

## 2023-01-27 LAB
CYTOLOGIST CVX/VAG CYTO: NORMAL
CYTOLOGY CVX/VAG DOC CYTO: NORMAL
CYTOLOGY CVX/VAG DOC THIN PREP: NORMAL
CYTOLOGY HISTORY:: NORMAL
DX ICD CODE: NORMAL
LABCORP, 190119: NORMAL
Lab: NORMAL
OTHER STN SPEC: NORMAL
STAT OF ADQ CVX/VAG CYTO-IMP: NORMAL

## 2023-05-17 ENCOUNTER — HOSPITAL ENCOUNTER (EMERGENCY)
Facility: HOSPITAL | Age: 73
Discharge: HOME | End: 2023-05-17
Attending: EMERGENCY MEDICINE | Admitting: EMERGENCY MEDICINE
Payer: MEDICARE

## 2023-05-17 VITALS
SYSTOLIC BLOOD PRESSURE: 181 MMHG | WEIGHT: 135 LBS | HEIGHT: 61 IN | RESPIRATION RATE: 16 BRPM | DIASTOLIC BLOOD PRESSURE: 99 MMHG | OXYGEN SATURATION: 100 % | HEART RATE: 96 BPM | BODY MASS INDEX: 25.49 KG/M2 | TEMPERATURE: 98 F

## 2023-05-17 DIAGNOSIS — H72.92 PERFORATION OF LEFT TYMPANIC MEMBRANE: Primary | ICD-10-CM

## 2023-05-17 DIAGNOSIS — T16.2XXA FOREIGN BODY OF LEFT EAR, INITIAL ENCOUNTER: ICD-10-CM

## 2023-05-17 PROCEDURE — 99282 EMERGENCY DEPT VISIT SF MDM: CPT

## 2023-05-17 ASSESSMENT — ENCOUNTER SYMPTOMS
NAUSEA: 0
DIARRHEA: 0
SHORTNESS OF BREATH: 0
VOMITING: 0
CONSTIPATION: 0

## 2023-05-17 ASSESSMENT — PAIN - FUNCTIONAL ASSESSMENT: PAIN_FUNCTIONAL_ASSESSMENT: NONE - DENIES PAIN

## 2023-05-17 NOTE — DISCHARGE INSTRUCTIONS
Do not stick any ear drops, Q tips or tissues in your ear. Keep your ear covered when you shower and do not get any water inside your ear. Keep your Alternate Tylenol and Motrin for pain. Follow up with your PCP and ENT for further management. Return to the ER if you experience severe or worsening symptoms such as persistent pain, fever, drainage from ear. O-L Flap Text: The defect edges were debeveled with a #15 scalpel blade.  Given the location of the defect, shape of the defect and the proximity to free margins an O-L flap was deemed most appropriate.  Using a sterile surgical marker, an appropriate advancement flap was drawn incorporating the defect and placing the expected incisions within the relaxed skin tension lines where possible.    The area thus outlined was incised deep to adipose tissue with a #15 scalpel blade.  The skin margins were undermined to an appropriate distance in all directions utilizing iris scissors.

## 2023-05-17 NOTE — ED NOTES
Discharge paperwork reviewed with patient. Patient verbalizes understanding. Patient leaves ER ambulatory and in no acute distress.       Izzy Shepherd RN  05/17/23 [id number]

## 2023-05-17 NOTE — ED PROVIDER NOTES
[facility] EMERGENCY DEPT  EMERGENCY DEPARTMENT ENCOUNTER      Pt Name: Tracy Zafar  MRN: [id number]  [place] 1950  Date of evaluation: 5/17/2023  Provider: Ora Peres PA-C    CHIEF COMPLAINT       Chief Complaint   Patient presents with    Foreign Body in [address]   (Location/Symptom, Timing/Onset, Context/Setting, Quality, Duration, Modifying Factors, Severity)  Note limiting factors. HPI    Tracy Zafar is a 67 y.o. female with Hx of hypertension, hypercholesterolemia, panic attack who presents ambulatory with family member to [facility] ED with cc of blood in left ear. Patient reports at [phone] last night she felt a bug fly into her left ear. She reports ear pruritus and pressure but no pain. She tried swimmer's ear drops, Q-tips and toilet paper inside her ear to help remove the bug. She reports mild to moderate bleeding from her ear after removal attempts. Denies ear pain, fever, chills, nausea, vomiting, headache, any other concerns or symptoms at this time. Patient did not visualize bug. PCP: BLAINE Emmanuel - NP    There are no other complaints, changes or physical findings at this time. Review of External Medical Records:     Nursing Notes were reviewed. REVIEW OF SYSTEMS    (2-9 systems for level 4, 10 or more for level 5)     Review of Systems   Constitutional:  Negative for chills and fever. HENT:  Positive for ear discharge and hearing loss. Negative for congestion and ear pain. Respiratory:  Negative for shortness of breath. Cardiovascular:  Negative for chest pain. Gastrointestinal:  Negative for constipation, diarrhea, nausea and vomiting. Genitourinary:  Negative for dysuria and hematuria. Skin:  Negative for rash. Neurological:  Negative for dizziness, light-headedness and headaches. All other systems reviewed and are negative.     Except as noted above the remainder of the review of systems was

## 2024-05-21 ENCOUNTER — TELEPHONE (OUTPATIENT)
Age: 74
End: 2024-05-21

## 2024-05-21 NOTE — TELEPHONE ENCOUNTER
Two patient identifiers used    73 year old patient last seen in the office on 1/24/2023  for ae    Patient reports she is returning call from the office    This nurse did not see any messages    Patient appointment confirmed for her mammogram and than patient asked to move her appointment .    Patient was rescheduled to be seen on 6/3/2024 at 12:20pm    6/25/2024 appointment cancelled    Patient verbalized understanding.

## 2025-02-12 ENCOUNTER — HOSPITAL ENCOUNTER (OUTPATIENT)
Facility: HOSPITAL | Age: 75
Discharge: HOME | End: 2025-02-15
Payer: MEDICARE

## 2025-02-12 VITALS
OXYGEN SATURATION: 98 % | TEMPERATURE: 97.5 F | WEIGHT: 140.87 LBS | DIASTOLIC BLOOD PRESSURE: 78 MMHG | SYSTOLIC BLOOD PRESSURE: 149 MMHG | HEIGHT: 62 IN | HEART RATE: 87 BPM | BODY MASS INDEX: 25.92 KG/M2 | RESPIRATION RATE: 16 BRPM

## 2025-02-12 LAB
ALBUMIN SERPL-MCNC: 4 G/DL (ref 3.5–5)
ALBUMIN/GLOB SERPL: 1.4 (ref 1.1–2.2)
ALP SERPL-CCNC: 115 U/L (ref 45–117)
ALT SERPL-CCNC: 21 U/L (ref 12–78)
ANION GAP SERPL CALC-SCNC: 6 MMOL/L (ref 2–12)
APPEARANCE UR: CLEAR
AST SERPL-CCNC: 22 U/L (ref 15–37)
BACTERIA URNS QL MICRO: NEGATIVE /HPF
BASOPHILS # BLD: 0.03 K/UL (ref 0–0.1)
BASOPHILS NFR BLD: 0.5 % (ref 0–1)
BILIRUB SERPL-MCNC: 0.4 MG/DL (ref 0.2–1)
BILIRUB UR QL: NEGATIVE
BUN SERPL-MCNC: 13 MG/DL (ref 6–20)
BUN/CREAT SERPL: 12 (ref 12–20)
CALCIUM SERPL-MCNC: 9.4 MG/DL (ref 8.5–10.1)
CHLORIDE SERPL-SCNC: 108 MMOL/L (ref 97–108)
CO2 SERPL-SCNC: 26 MMOL/L (ref 21–32)
COLOR UR: ABNORMAL
CREAT SERPL-MCNC: 1.06 MG/DL (ref 0.55–1.02)
DIFFERENTIAL METHOD BLD: NORMAL
EOSINOPHIL # BLD: 0.16 K/UL (ref 0–0.4)
EOSINOPHIL NFR BLD: 2.9 % (ref 0–7)
EPITH CASTS URNS QL MICRO: ABNORMAL /LPF
ERYTHROCYTE [DISTWIDTH] IN BLOOD BY AUTOMATED COUNT: 13.8 % (ref 11.5–14.5)
EST. AVERAGE GLUCOSE BLD GHB EST-MCNC: 111 MG/DL
GLOBULIN SER CALC-MCNC: 2.8 G/DL (ref 2–4)
GLUCOSE SERPL-MCNC: 133 MG/DL (ref 65–100)
GLUCOSE UR STRIP.AUTO-MCNC: NEGATIVE MG/DL
HBA1C MFR BLD: 5.5 % (ref 4–5.6)
HCT VFR BLD AUTO: 37.2 % (ref 35–47)
HGB BLD-MCNC: 12 G/DL (ref 11.5–16)
HGB UR QL STRIP: NEGATIVE
HYALINE CASTS URNS QL MICRO: ABNORMAL /LPF (ref 0–2)
IMM GRANULOCYTES # BLD AUTO: 0.03 K/UL (ref 0–0.04)
IMM GRANULOCYTES NFR BLD AUTO: 0.5 % (ref 0–0.5)
KETONES UR QL STRIP.AUTO: ABNORMAL MG/DL
LEUKOCYTE ESTERASE UR QL STRIP.AUTO: NEGATIVE
LYMPHOCYTES # BLD: 1.33 K/UL (ref 0.8–3.5)
LYMPHOCYTES NFR BLD: 24.1 % (ref 12–49)
MCH RBC QN AUTO: 30.6 PG (ref 26–34)
MCHC RBC AUTO-ENTMCNC: 32.3 G/DL (ref 30–36.5)
MCV RBC AUTO: 94.9 FL (ref 80–99)
MONOCYTES # BLD: 0.41 K/UL (ref 0–1)
MONOCYTES NFR BLD: 7.5 % (ref 5–13)
NEUTS SEG # BLD: 3.54 K/UL (ref 1.8–8)
NEUTS SEG NFR BLD: 64.5 % (ref 32–75)
NITRITE UR QL STRIP.AUTO: NEGATIVE
NRBC # BLD: 0 K/UL (ref 0–0.01)
NRBC BLD-RTO: 0 PER 100 WBC
PH UR STRIP: 5.5 (ref 5–8)
PLATELET # BLD AUTO: 259 K/UL (ref 150–400)
PMV BLD AUTO: 10 FL (ref 8.9–12.9)
POTASSIUM SERPL-SCNC: 4.1 MMOL/L (ref 3.5–5.1)
PROT SERPL-MCNC: 6.8 G/DL (ref 6.4–8.2)
PROT UR STRIP-MCNC: NEGATIVE MG/DL
RBC # BLD AUTO: 3.92 M/UL (ref 3.8–5.2)
RBC #/AREA URNS HPF: ABNORMAL /HPF (ref 0–5)
RBC MORPH BLD: NORMAL
SODIUM SERPL-SCNC: 140 MMOL/L (ref 136–145)
SP GR UR REFRACTOMETRY: 1.02 (ref 1–1.03)
URINE CULTURE IF INDICATED: ABNORMAL
UROBILINOGEN UR QL STRIP.AUTO: 1 EU/DL (ref 0.2–1)
WBC # BLD AUTO: 5.5 K/UL (ref 3.6–11)
WBC URNS QL MICRO: ABNORMAL /HPF (ref 0–4)

## 2025-02-12 PROCEDURE — 80053 COMPREHEN METABOLIC PANEL: CPT

## 2025-02-12 PROCEDURE — 83036 HEMOGLOBIN GLYCOSYLATED A1C: CPT

## 2025-02-12 PROCEDURE — 36415 COLL VENOUS BLD VENIPUNCTURE: CPT

## 2025-02-12 PROCEDURE — 93005 ELECTROCARDIOGRAM TRACING: CPT | Performed by: ORTHOPAEDIC SURGERY

## 2025-02-12 PROCEDURE — 85025 COMPLETE CBC W/AUTO DIFF WBC: CPT

## 2025-02-12 PROCEDURE — 81001 URINALYSIS AUTO W/SCOPE: CPT

## 2025-02-12 ASSESSMENT — PAIN DESCRIPTION - ORIENTATION: ORIENTATION: RIGHT

## 2025-02-12 ASSESSMENT — PAIN DESCRIPTION - DESCRIPTORS: DESCRIPTORS: ACHING

## 2025-02-12 ASSESSMENT — PAIN SCALES - GENERAL: PAINLEVEL_OUTOF10: 3

## 2025-02-12 ASSESSMENT — PAIN DESCRIPTION - PAIN TYPE: TYPE: CHRONIC PAIN

## 2025-02-12 ASSESSMENT — PAIN DESCRIPTION - LOCATION: LOCATION: KNEE

## 2025-02-12 NOTE — DISCHARGE INSTRUCTIONS
surgery, please do not apply anything to your skin (lotions, powders, deodorant, or makeup, especially mascara)  Follow [employer] body wash instructions provided to you during PAT appointment. Begin 3 days prior to surgery.  Do not shave or trim anywhere 24 hours before surgery  Wear your hair loose or down; no pony-tails, buns, or metal hair clips  Wear loose, comfortable, clean clothes  Wear glasses instead of contacts  Leave money, valuables, and jewelry, including body piercings, at home  If you were given an Incentive Spirometer, bring it on day of surgery.     Going Home - or Spending the Night SAME-DAY SURGERY: You must have a responsible adult drive you home and stay with you 24 hours after surgery  ADMITS: If your doctor is keeping you in the hospital after surgery, leave personal belongings/luggage in your car until you have a hospital room number.    Hospital discharge time is 12 noon  Drivers must be here before 12 noon unless you are told differently   Special Instructions        Follow all instructions so your surgery won’t be cancelled.  Please, be on time.                    If a situation occurs and you are delayed the day of surgery, call  [phone].    If your physical condition changes (like a fever, cold, flu, etc.) call your surgeon.    Home medication(s) reviewed and verified via      LIST   [employer]   during PAT appointment.    The patient was contacted by     IN-PERSON  The patient verbalizes understanding of all instructions and      DOES NOT   need reinforcement.

## 2025-02-13 LAB
BACTERIA SPEC CULT: NORMAL
BACTERIA SPEC CULT: NORMAL
SERVICE CMNT-IMP: NORMAL

## 2025-02-14 LAB
EKG ATRIAL RATE: 65 BPM
EKG DIAGNOSIS: NORMAL
EKG P AXIS: 64 DEGREES
EKG P-R INTERVAL: 130 MS
EKG Q-T INTERVAL: 404 MS
EKG QRS DURATION: 82 MS
EKG QTC CALCULATION (BAZETT): 420 MS
EKG R AXIS: -3 DEGREES
EKG T AXIS: 49 DEGREES
EKG VENTRICULAR RATE: 65 BPM

## (undated) DEVICE — 1200 GUARD II KIT W/5MM TUBE W/O VAC TUBE: Brand: GUARDIAN

## (undated) DEVICE — BAG BELONG PT PERS CLEAR HANDL

## (undated) DEVICE — Device

## (undated) DEVICE — CUFF BLD PRSS AD CLTH SGL TB W/ BAYNT CONN ROUNDED CORNER

## (undated) DEVICE — ELECTRODE,RADIOTRANSLUCENT,FOAM,3PK: Brand: MEDLINE

## (undated) DEVICE — SIMPLICITY FLUFF UNDERPAD 23X36, MODERATE: Brand: SIMPLICITY

## (undated) DEVICE — ADULT SPO2 SENSOR: Brand: NELLCOR

## (undated) DEVICE — SET ADMIN 16ML TBNG L100IN 2 Y INJ SITE IV PIGGY BK DISP

## (undated) DEVICE — SOLIDIFIER MEDC 1200ML -- CONVERT TO 356117

## (undated) DEVICE — KIT COLON W/ 1.1OZ LUB AND 2 END

## (undated) DEVICE — CATH IV AUTOGRD BC PNK 20GA 25 -- INSYTE